# Patient Record
Sex: MALE | Race: WHITE | NOT HISPANIC OR LATINO | Employment: OTHER | URBAN - NONMETROPOLITAN AREA
[De-identification: names, ages, dates, MRNs, and addresses within clinical notes are randomized per-mention and may not be internally consistent; named-entity substitution may affect disease eponyms.]

---

## 2017-05-04 ENCOUNTER — TRANSCRIBE ORDERS (OUTPATIENT)
Dept: ADMINISTRATIVE | Facility: HOSPITAL | Age: 57
End: 2017-05-04

## 2017-05-04 DIAGNOSIS — R06.02 SOB (SHORTNESS OF BREATH): ICD-10-CM

## 2017-05-04 DIAGNOSIS — I25.10 ATHEROSCLEROSIS OF NATIVE CORONARY ARTERY OF NATIVE HEART WITHOUT ANGINA PECTORIS: ICD-10-CM

## 2017-05-04 DIAGNOSIS — R53.83 FATIGUE, UNSPECIFIED TYPE: Primary | ICD-10-CM

## 2018-04-03 ENCOUNTER — HOSPITAL ENCOUNTER (OUTPATIENT)
Dept: GENERAL RADIOLOGY | Facility: HOSPITAL | Age: 58
Discharge: HOME | End: 2018-04-03
Admitting: NURSE PRACTITIONER

## 2018-04-03 ENCOUNTER — TRANSCRIBE ORDERS (OUTPATIENT)
Dept: GENERAL RADIOLOGY | Facility: HOSPITAL | Age: 58
End: 2018-04-03

## 2018-04-03 DIAGNOSIS — M25.562 LEFT KNEE PAIN, UNSPECIFIED CHRONICITY: Primary | ICD-10-CM

## 2018-04-03 PROCEDURE — 73562 X-RAY EXAM OF KNEE 3: CPT

## 2018-05-17 ENCOUNTER — TRANSCRIBE ORDERS (OUTPATIENT)
Dept: ADMINISTRATIVE | Facility: HOSPITAL | Age: 58
End: 2018-05-17

## 2018-05-17 DIAGNOSIS — I20.0 UNSTABLE ANGINA PECTORIS (HCC): Primary | ICD-10-CM

## 2018-05-31 ENCOUNTER — HOSPITAL ENCOUNTER (OUTPATIENT)
Dept: CARDIOLOGY | Facility: HOSPITAL | Age: 58
Discharge: HOME | End: 2018-05-31
Attending: FAMILY MEDICINE

## 2018-05-31 ENCOUNTER — HOSPITAL ENCOUNTER (OUTPATIENT)
Dept: GENERAL RADIOLOGY | Facility: HOSPITAL | Age: 58
Discharge: HOME | End: 2018-05-31
Attending: FAMILY MEDICINE

## 2018-05-31 ENCOUNTER — TRANSCRIBE ORDERS (OUTPATIENT)
Dept: ADMINISTRATIVE | Facility: HOSPITAL | Age: 58
End: 2018-05-31

## 2018-05-31 ENCOUNTER — HOSPITAL ENCOUNTER (INPATIENT)
Facility: HOSPITAL | Age: 58
LOS: 1 days | Discharge: HOME | End: 2018-06-01
Attending: FAMILY MEDICINE | Admitting: FAMILY MEDICINE

## 2018-05-31 VITALS — HEART RATE: 72 BPM | SYSTOLIC BLOOD PRESSURE: 124 MMHG | DIASTOLIC BLOOD PRESSURE: 80 MMHG

## 2018-05-31 DIAGNOSIS — I20.0 UNSTABLE ANGINA (HCC): Primary | ICD-10-CM

## 2018-05-31 DIAGNOSIS — R06.02 SHORTNESS OF BREATH: ICD-10-CM

## 2018-05-31 DIAGNOSIS — I25.5 ISCHEMIC CARDIOMYOPATHY: Primary | ICD-10-CM

## 2018-05-31 DIAGNOSIS — I20.0 UNSTABLE ANGINA PECTORIS (HCC): ICD-10-CM

## 2018-05-31 LAB
APTT PPP: 28 SECONDS (ref 24.1–34.8)
ARTERIAL PATENCY WRIST A: POSITIVE
ATMOSPHERIC PRESS: 746 MMHG
BASE EXCESS BLDA CALC-SCNC: 2.2 MMOL/L (ref 0–2)
BASOPHILS # BLD AUTO: 0.12 10*3/MM3 (ref 0–0.2)
BASOPHILS NFR BLD AUTO: 1.1 % (ref 0–2)
BDY SITE: ABNORMAL
BH CV NUCLEAR PRIOR STUDY: 2
BH CV STRESS BP STAGE 1: NORMAL
BH CV STRESS COMMENTS STAGE 1: NORMAL
BH CV STRESS DOSE REGADENOSON STAGE 1: 0.4
BH CV STRESS DURATION MIN STAGE 1: 0
BH CV STRESS DURATION SEC STAGE 1: 10
BH CV STRESS HR STAGE 1: 86
BH CV STRESS PROTOCOL 1: NORMAL
BH CV STRESS RECOVERY BP: NORMAL MMHG
BH CV STRESS RECOVERY HR: 85 BPM
BH CV STRESS STAGE 1: 1
BILIRUB UR QL STRIP: NEGATIVE
BODY TEMPERATURE: 37 C
CLARITY UR: CLEAR
COLOR UR: YELLOW
D-LACTATE SERPL-SCNC: 1.1 MMOL/L (ref 0.5–2)
DEPRECATED RDW RBC AUTO: 40 FL (ref 40–54)
EOSINOPHIL # BLD AUTO: 0.3 10*3/MM3 (ref 0–0.7)
EOSINOPHIL NFR BLD AUTO: 2.7 % (ref 0–4)
ERYTHROCYTE [DISTWIDTH] IN BLOOD BY AUTOMATED COUNT: 12.2 % (ref 12–15)
GLUCOSE UR STRIP-MCNC: NEGATIVE MG/DL
HCO3 BLDA-SCNC: 26.9 MMOL/L (ref 20–26)
HCT VFR BLD AUTO: 45 % (ref 40–52)
HGB BLD-MCNC: 15.7 G/DL (ref 14–18)
HGB UR QL STRIP.AUTO: NEGATIVE
IMM GRANULOCYTES # BLD: 0.06 10*3/MM3 (ref 0–0.03)
IMM GRANULOCYTES NFR BLD: 0.5 % (ref 0–5)
INR PPP: 0.9 (ref 0.91–1.09)
KETONES UR QL STRIP: NEGATIVE
LEUKOCYTE ESTERASE UR QL STRIP.AUTO: NEGATIVE
LV EF NUC BP: 29 %
LYMPHOCYTES # BLD AUTO: 3.14 10*3/MM3 (ref 0.72–4.86)
LYMPHOCYTES NFR BLD AUTO: 28.6 % (ref 15–45)
Lab: ABNORMAL
MAGNESIUM SERPL-MCNC: 2 MG/DL (ref 1.4–2.2)
MAXIMAL PREDICTED HEART RATE: 161 BPM
MCH RBC QN AUTO: 31.3 PG (ref 28–32)
MCHC RBC AUTO-ENTMCNC: 34.9 G/DL (ref 33–36)
MCV RBC AUTO: 89.6 FL (ref 82–95)
MODALITY: ABNORMAL
MONOCYTES # BLD AUTO: 1 10*3/MM3 (ref 0.19–1.3)
MONOCYTES NFR BLD AUTO: 9.1 % (ref 4–12)
MYOGLOBIN SERPL-MCNC: 21 NG/ML (ref 0–110)
NEUTROPHILS # BLD AUTO: 6.35 10*3/MM3 (ref 1.87–8.4)
NEUTROPHILS NFR BLD AUTO: 58 % (ref 39–78)
NITRITE UR QL STRIP: NEGATIVE
NRBC BLD MANUAL-RTO: 0 /100 WBC (ref 0–0)
NT-PROBNP SERPL-MCNC: 92.4 PG/ML (ref 0–900)
PCO2 BLDA: 41 MM HG (ref 35–45)
PERCENT MAX PREDICTED HR: 53.42 %
PH BLDA: 7.42 PH UNITS (ref 7.35–7.45)
PH UR STRIP.AUTO: 5.5 [PH] (ref 5–8)
PLATELET # BLD AUTO: 221 10*3/MM3 (ref 130–400)
PMV BLD AUTO: 9.7 FL (ref 6–12)
PO2 BLDA: 72.8 MM HG (ref 83–108)
PROT UR QL STRIP: NEGATIVE
PROTHROMBIN TIME: 12.4 SECONDS (ref 11.9–14.6)
RBC # BLD AUTO: 5.02 10*6/MM3 (ref 4.8–5.9)
SAO2 % BLDCOA: 95.3 % (ref 94–99)
SP GR UR STRIP: 1.02 (ref 1–1.03)
STRESS BASELINE BP: NORMAL MMHG
STRESS BASELINE HR: 70 BPM
STRESS PERCENT HR: 63 %
STRESS POST EXERCISE DUR SEC: 10 SEC
STRESS POST PEAK BP: NORMAL MMHG
STRESS POST PEAK HR: 86 BPM
STRESS TARGET HR: 137 BPM
T4 FREE SERPL-MCNC: 1 NG/DL (ref 0.78–2.19)
TSH SERPL DL<=0.05 MIU/L-ACNC: 1.94 MIU/ML (ref 0.47–4.68)
UROBILINOGEN UR QL STRIP: NORMAL
VENTILATOR MODE: ABNORMAL
WBC NRBC COR # BLD: 10.97 10*3/MM3 (ref 4.8–10.8)

## 2018-05-31 PROCEDURE — 83874 ASSAY OF MYOGLOBIN: CPT | Performed by: NURSE PRACTITIONER

## 2018-05-31 PROCEDURE — 94799 UNLISTED PULMONARY SVC/PX: CPT

## 2018-05-31 PROCEDURE — 85610 PROTHROMBIN TIME: CPT | Performed by: NURSE PRACTITIONER

## 2018-05-31 PROCEDURE — 93005 ELECTROCARDIOGRAM TRACING: CPT | Performed by: NURSE PRACTITIONER

## 2018-05-31 PROCEDURE — 81003 URINALYSIS AUTO W/O SCOPE: CPT | Performed by: NURSE PRACTITIONER

## 2018-05-31 PROCEDURE — 93017 CV STRESS TEST TRACING ONLY: CPT

## 2018-05-31 PROCEDURE — 71046 X-RAY EXAM CHEST 2 VIEWS: CPT

## 2018-05-31 PROCEDURE — 85730 THROMBOPLASTIN TIME PARTIAL: CPT | Performed by: NURSE PRACTITIONER

## 2018-05-31 PROCEDURE — 84443 ASSAY THYROID STIM HORMONE: CPT | Performed by: NURSE PRACTITIONER

## 2018-05-31 PROCEDURE — 25010000002 ENOXAPARIN PER 10 MG: Performed by: NURSE PRACTITIONER

## 2018-05-31 PROCEDURE — 84439 ASSAY OF FREE THYROXINE: CPT | Performed by: NURSE PRACTITIONER

## 2018-05-31 PROCEDURE — 36600 WITHDRAWAL OF ARTERIAL BLOOD: CPT

## 2018-05-31 PROCEDURE — 78452 HT MUSCLE IMAGE SPECT MULT: CPT | Performed by: INTERNAL MEDICINE

## 2018-05-31 PROCEDURE — 78452 HT MUSCLE IMAGE SPECT MULT: CPT

## 2018-05-31 PROCEDURE — A9500 TC99M SESTAMIBI: HCPCS | Performed by: FAMILY MEDICINE

## 2018-05-31 PROCEDURE — 0 TECHNETIUM SESTAMIBI: Performed by: FAMILY MEDICINE

## 2018-05-31 PROCEDURE — 82803 BLOOD GASES ANY COMBINATION: CPT

## 2018-05-31 PROCEDURE — 83880 ASSAY OF NATRIURETIC PEPTIDE: CPT | Performed by: NURSE PRACTITIONER

## 2018-05-31 PROCEDURE — 93010 ELECTROCARDIOGRAM REPORT: CPT | Performed by: INTERNAL MEDICINE

## 2018-05-31 PROCEDURE — 25010000002 REGADENOSON 0.4 MG/5ML SOLUTION: Performed by: INTERNAL MEDICINE

## 2018-05-31 PROCEDURE — 93018 CV STRESS TEST I&R ONLY: CPT | Performed by: INTERNAL MEDICINE

## 2018-05-31 PROCEDURE — 99253 IP/OBS CNSLTJ NEW/EST LOW 45: CPT | Performed by: INTERNAL MEDICINE

## 2018-05-31 PROCEDURE — 83735 ASSAY OF MAGNESIUM: CPT | Performed by: NURSE PRACTITIONER

## 2018-05-31 PROCEDURE — 85025 COMPLETE CBC W/AUTO DIFF WBC: CPT | Performed by: NURSE PRACTITIONER

## 2018-05-31 PROCEDURE — 83605 ASSAY OF LACTIC ACID: CPT | Performed by: NURSE PRACTITIONER

## 2018-05-31 RX ORDER — CYCLOBENZAPRINE HCL 10 MG
10 TABLET ORAL 3 TIMES DAILY PRN
COMMUNITY

## 2018-05-31 RX ORDER — ASPIRIN 81 MG/1
81 TABLET ORAL DAILY
COMMUNITY

## 2018-05-31 RX ORDER — SODIUM CHLORIDE 0.9 % (FLUSH) 0.9 %
1-10 SYRINGE (ML) INJECTION AS NEEDED
Status: DISCONTINUED | OUTPATIENT
Start: 2018-05-31 | End: 2018-06-01 | Stop reason: HOSPADM

## 2018-05-31 RX ORDER — LISINOPRIL 20 MG/1
40 TABLET ORAL DAILY
Status: DISCONTINUED | OUTPATIENT
Start: 2018-05-31 | End: 2018-06-01

## 2018-05-31 RX ORDER — ROSUVASTATIN CALCIUM 10 MG/1
10 TABLET, COATED ORAL DAILY
Status: DISCONTINUED | OUTPATIENT
Start: 2018-05-31 | End: 2018-06-01

## 2018-05-31 RX ORDER — CLOPIDOGREL BISULFATE 75 MG/1
75 TABLET ORAL DAILY
Status: DISCONTINUED | OUTPATIENT
Start: 2018-05-31 | End: 2018-06-01

## 2018-05-31 RX ORDER — LISINOPRIL 40 MG/1
40 TABLET ORAL DAILY
COMMUNITY
End: 2018-09-17

## 2018-05-31 RX ORDER — CLONAZEPAM 0.5 MG/1
0.5 TABLET ORAL 2 TIMES DAILY PRN
Status: DISCONTINUED | OUTPATIENT
Start: 2018-05-31 | End: 2018-06-01 | Stop reason: HOSPADM

## 2018-05-31 RX ORDER — ASPIRIN 81 MG/1
81 TABLET ORAL DAILY
Status: DISCONTINUED | OUTPATIENT
Start: 2018-05-31 | End: 2018-06-01 | Stop reason: HOSPADM

## 2018-05-31 RX ORDER — CYCLOBENZAPRINE HCL 10 MG
10 TABLET ORAL 3 TIMES DAILY PRN
Status: DISCONTINUED | OUTPATIENT
Start: 2018-05-31 | End: 2018-06-01 | Stop reason: HOSPADM

## 2018-05-31 RX ORDER — SODIUM CHLORIDE 9 MG/ML
50 INJECTION, SOLUTION INTRAVENOUS CONTINUOUS
Status: DISCONTINUED | OUTPATIENT
Start: 2018-05-31 | End: 2018-06-01 | Stop reason: HOSPADM

## 2018-05-31 RX ORDER — LORAZEPAM 2 MG/ML
0.5 INJECTION INTRAMUSCULAR EVERY 6 HOURS PRN
Status: DISCONTINUED | OUTPATIENT
Start: 2018-05-31 | End: 2018-06-01 | Stop reason: HOSPADM

## 2018-05-31 RX ORDER — CLOPIDOGREL BISULFATE 75 MG/1
75 TABLET ORAL DAILY
COMMUNITY
End: 2022-04-14 | Stop reason: HOSPADM

## 2018-05-31 RX ORDER — CLONAZEPAM 1 MG/1
1 TABLET ORAL NIGHTLY PRN
COMMUNITY

## 2018-05-31 RX ORDER — SODIUM CHLORIDE 0.9 % (FLUSH) 0.9 %
1-10 SYRINGE (ML) INJECTION AS NEEDED
Status: DISCONTINUED | OUTPATIENT
Start: 2018-05-31 | End: 2018-05-31

## 2018-05-31 RX ADMIN — LISINOPRIL 40 MG: 20 TABLET ORAL at 20:08

## 2018-05-31 RX ADMIN — TECHNETIUM TC 99M SESTAMIBI 1 DOSE: 1 INJECTION INTRAVENOUS at 08:09

## 2018-05-31 RX ADMIN — ASPIRIN 81 MG: 81 TABLET ORAL at 20:06

## 2018-05-31 RX ADMIN — TECHNETIUM TC 99M SESTAMIBI 1 DOSE: 1 INJECTION INTRAVENOUS at 10:00

## 2018-05-31 RX ADMIN — REGADENOSON 0.4 MG: 0.08 INJECTION, SOLUTION INTRAVENOUS at 09:35

## 2018-05-31 RX ADMIN — CLOPIDOGREL 75 MG: 75 TABLET, FILM COATED ORAL at 20:07

## 2018-05-31 RX ADMIN — ROSUVASTATIN CALCIUM 10 MG: 10 TABLET, FILM COATED ORAL at 20:07

## 2018-05-31 RX ADMIN — CLONAZEPAM 0.5 MG: 0.5 TABLET ORAL at 20:06

## 2018-05-31 RX ADMIN — ENOXAPARIN SODIUM 40 MG: 40 INJECTION SUBCUTANEOUS at 20:08

## 2018-06-01 VITALS
TEMPERATURE: 97.7 F | HEART RATE: 63 BPM | DIASTOLIC BLOOD PRESSURE: 88 MMHG | OXYGEN SATURATION: 95 % | RESPIRATION RATE: 15 BRPM | WEIGHT: 197.9 LBS | HEIGHT: 72 IN | BODY MASS INDEX: 26.81 KG/M2 | SYSTOLIC BLOOD PRESSURE: 131 MMHG

## 2018-06-01 LAB
ALBUMIN SERPL-MCNC: 4.1 G/DL (ref 3.5–5)
ALBUMIN/GLOB SERPL: 1.5 G/DL (ref 1.1–2.5)
ALP SERPL-CCNC: 52 U/L (ref 24–120)
ALT SERPL W P-5'-P-CCNC: 41 U/L (ref 0–54)
ANION GAP SERPL CALCULATED.3IONS-SCNC: 11 MMOL/L (ref 4–13)
ARTERIAL PATENCY WRIST A: NORMAL
AST SERPL-CCNC: 30 U/L (ref 7–45)
ATMOSPHERIC PRESS: 748 MMHG
ATMOSPHERIC PRESS: 748 MMHG
BASOPHILS # BLD MANUAL: 0.16 10*3/MM3 (ref 0–0.2)
BASOPHILS NFR BLD AUTO: 2 % (ref 0–2)
BDY SITE: NORMAL
BDY SITE: NORMAL
BILIRUB SERPL-MCNC: 0.8 MG/DL (ref 0.1–1)
BODY TEMPERATURE: 37 C
BODY TEMPERATURE: 37 C
BUN BLD-MCNC: 16 MG/DL (ref 5–21)
BUN/CREAT SERPL: 21.9 (ref 7–25)
CALCIUM SPEC-SCNC: 9.4 MG/DL (ref 8.4–10.4)
CHLORIDE SERPL-SCNC: 102 MMOL/L (ref 98–110)
CO2 SERPL-SCNC: 26 MMOL/L (ref 24–31)
COHGB MFR BLD: 0.8 % (ref 0–5)
COHGB MFR BLD: 1.1 % (ref 0–5)
CREAT BLD-MCNC: 0.73 MG/DL (ref 0.5–1.4)
DEPRECATED RDW RBC AUTO: 40.9 FL (ref 40–54)
ERYTHROCYTE [DISTWIDTH] IN BLOOD BY AUTOMATED COUNT: 12.4 % (ref 12–15)
GAS FLOW AIRWAY: 2 LPM
GAS FLOW AIRWAY: 2 LPM
GFR SERPL CREATININE-BSD FRML MDRD: 110 ML/MIN/1.73
GLOBULIN UR ELPH-MCNC: 2.7 GM/DL
GLUCOSE BLD-MCNC: 108 MG/DL (ref 70–100)
HCT VFR BLD AUTO: 44.8 % (ref 40–52)
HGB BLD-MCNC: 15.7 G/DL (ref 14–18)
HGB BLDA-MCNC: 16 G/DL (ref 14–18)
HGB BLDA-MCNC: 16.2 G/DL (ref 14–18)
LYMPHOCYTES # BLD MANUAL: 2.1 10*3/MM3 (ref 0.72–4.86)
LYMPHOCYTES NFR BLD MANUAL: 2 % (ref 4–12)
LYMPHOCYTES NFR BLD MANUAL: 26.3 % (ref 15–45)
Lab: NORMAL
Lab: NORMAL
MCH RBC QN AUTO: 31.5 PG (ref 28–32)
MCHC RBC AUTO-ENTMCNC: 35 G/DL (ref 33–36)
MCV RBC AUTO: 89.8 FL (ref 82–95)
METHGB BLD QL: 0.7 % (ref 0–3)
METHGB BLD QL: 0.8 % (ref 0–3)
MODALITY: NORMAL
MODALITY: NORMAL
MONOCYTES # BLD AUTO: 0.16 10*3/MM3 (ref 0.19–1.3)
NEUTROPHILS # BLD AUTO: 5.08 10*3/MM3 (ref 1.87–8.4)
NEUTROPHILS NFR BLD MANUAL: 63.6 % (ref 39–78)
NT-PROBNP SERPL-MCNC: 96.1 PG/ML (ref 0–900)
OXYHGB MFR BLDV: 74.9 % (ref 45–75)
OXYHGB MFR BLDV: 96.8 % (ref 94–99)
PLAT MORPH BLD: NORMAL
PLATELET # BLD AUTO: 192 10*3/MM3 (ref 130–400)
PMV BLD AUTO: 9.3 FL (ref 6–12)
POTASSIUM BLD-SCNC: 4.3 MMOL/L (ref 3.5–5.3)
PROT SERPL-MCNC: 6.8 G/DL (ref 6.3–8.7)
RBC # BLD AUTO: 4.99 10*6/MM3 (ref 4.8–5.9)
RBC MORPH BLD: NORMAL
SODIUM BLD-SCNC: 139 MMOL/L (ref 135–145)
VARIANT LYMPHS NFR BLD MANUAL: 6.1 % (ref 0–5)
VENTILATOR MODE: NORMAL
VENTILATOR MODE: NORMAL
WBC MORPH BLD: NORMAL
WBC NRBC COR # BLD: 7.99 10*3/MM3 (ref 4.8–10.8)

## 2018-06-01 PROCEDURE — B2111ZZ FLUOROSCOPY OF MULTIPLE CORONARY ARTERIES USING LOW OSMOLAR CONTRAST: ICD-10-PCS | Performed by: INTERNAL MEDICINE

## 2018-06-01 PROCEDURE — 82375 ASSAY CARBOXYHB QUANT: CPT

## 2018-06-01 PROCEDURE — 85007 BL SMEAR W/DIFF WBC COUNT: CPT | Performed by: NURSE PRACTITIONER

## 2018-06-01 PROCEDURE — 0 IOPAMIDOL PER 1 ML: Performed by: INTERNAL MEDICINE

## 2018-06-01 PROCEDURE — 83050 HGB METHEMOGLOBIN QUAN: CPT

## 2018-06-01 PROCEDURE — 83880 ASSAY OF NATRIURETIC PEPTIDE: CPT | Performed by: NURSE PRACTITIONER

## 2018-06-01 PROCEDURE — B2151ZZ FLUOROSCOPY OF LEFT HEART USING LOW OSMOLAR CONTRAST: ICD-10-PCS | Performed by: INTERNAL MEDICINE

## 2018-06-01 PROCEDURE — 80053 COMPREHEN METABOLIC PANEL: CPT | Performed by: NURSE PRACTITIONER

## 2018-06-01 PROCEDURE — 25010000002 DIPHENHYDRAMINE PER 50 MG: Performed by: INTERNAL MEDICINE

## 2018-06-01 PROCEDURE — C1894 INTRO/SHEATH, NON-LASER: HCPCS | Performed by: INTERNAL MEDICINE

## 2018-06-01 PROCEDURE — 93457 R HRT ART/GRFT ANGIO: CPT | Performed by: INTERNAL MEDICINE

## 2018-06-01 PROCEDURE — 25010000002 MIDAZOLAM PER 1 MG: Performed by: INTERNAL MEDICINE

## 2018-06-01 PROCEDURE — C1760 CLOSURE DEV, VASC: HCPCS | Performed by: INTERNAL MEDICINE

## 2018-06-01 PROCEDURE — 94799 UNLISTED PULMONARY SVC/PX: CPT

## 2018-06-01 PROCEDURE — 85027 COMPLETE CBC AUTOMATED: CPT | Performed by: NURSE PRACTITIONER

## 2018-06-01 PROCEDURE — 99152 MOD SED SAME PHYS/QHP 5/>YRS: CPT | Performed by: INTERNAL MEDICINE

## 2018-06-01 PROCEDURE — 25010000002 FENTANYL CITRATE (PF) 100 MCG/2ML SOLUTION: Performed by: INTERNAL MEDICINE

## 2018-06-01 PROCEDURE — 82820 HEMOGLOBIN-OXYGEN AFFINITY: CPT

## 2018-06-01 PROCEDURE — 94760 N-INVAS EAR/PLS OXIMETRY 1: CPT

## 2018-06-01 PROCEDURE — 4A023N7 MEASUREMENT OF CARDIAC SAMPLING AND PRESSURE, LEFT HEART, PERCUTANEOUS APPROACH: ICD-10-PCS | Performed by: INTERNAL MEDICINE

## 2018-06-01 RX ORDER — FENTANYL CITRATE 50 UG/ML
INJECTION, SOLUTION INTRAMUSCULAR; INTRAVENOUS AS NEEDED
Status: DISCONTINUED | OUTPATIENT
Start: 2018-06-01 | End: 2018-06-01 | Stop reason: HOSPADM

## 2018-06-01 RX ORDER — DIPHENHYDRAMINE HYDROCHLORIDE 50 MG/ML
INJECTION INTRAMUSCULAR; INTRAVENOUS AS NEEDED
Status: DISCONTINUED | OUTPATIENT
Start: 2018-06-01 | End: 2018-06-01 | Stop reason: HOSPADM

## 2018-06-01 RX ORDER — ACETAMINOPHEN 325 MG/1
650 TABLET ORAL EVERY 4 HOURS PRN
Status: DISCONTINUED | OUTPATIENT
Start: 2018-06-01 | End: 2018-06-01 | Stop reason: HOSPADM

## 2018-06-01 RX ORDER — CLOPIDOGREL BISULFATE 75 MG/1
75 TABLET ORAL NIGHTLY
Status: DISCONTINUED | OUTPATIENT
Start: 2018-06-01 | End: 2018-06-01 | Stop reason: HOSPADM

## 2018-06-01 RX ORDER — LISINOPRIL 20 MG/1
40 TABLET ORAL NIGHTLY
Status: DISCONTINUED | OUTPATIENT
Start: 2018-06-01 | End: 2018-06-01 | Stop reason: HOSPADM

## 2018-06-01 RX ORDER — SODIUM CHLORIDE 9 MG/ML
100 INJECTION, SOLUTION INTRAVENOUS CONTINUOUS
Status: DISCONTINUED | OUTPATIENT
Start: 2018-06-01 | End: 2018-06-01 | Stop reason: HOSPADM

## 2018-06-01 RX ORDER — MIDAZOLAM HYDROCHLORIDE 1 MG/ML
INJECTION INTRAMUSCULAR; INTRAVENOUS AS NEEDED
Status: DISCONTINUED | OUTPATIENT
Start: 2018-06-01 | End: 2018-06-01 | Stop reason: HOSPADM

## 2018-06-01 RX ORDER — LIDOCAINE HYDROCHLORIDE 20 MG/ML
INJECTION, SOLUTION INFILTRATION; PERINEURAL AS NEEDED
Status: DISCONTINUED | OUTPATIENT
Start: 2018-06-01 | End: 2018-06-01 | Stop reason: HOSPADM

## 2018-06-01 RX ORDER — ROSUVASTATIN CALCIUM 10 MG/1
10 TABLET, COATED ORAL NIGHTLY
Status: DISCONTINUED | OUTPATIENT
Start: 2018-06-01 | End: 2018-06-01 | Stop reason: HOSPADM

## 2018-06-01 RX ADMIN — ASPIRIN 81 MG: 81 TABLET ORAL at 08:11

## 2018-06-01 RX ADMIN — METOPROLOL TARTRATE 25 MG: 25 TABLET, FILM COATED ORAL at 13:03

## 2018-06-01 RX ADMIN — SODIUM CHLORIDE 50 ML/HR: 9 INJECTION, SOLUTION INTRAVENOUS at 06:50

## 2018-06-01 NOTE — PLAN OF CARE
Problem: Patient Care Overview  Goal: Plan of Care Review  Outcome: Ongoing (interventions implemented as appropriate)   06/01/18 0430   Coping/Psychosocial   Plan of Care Reviewed With patient   Plan of Care Review   Progress no change   OTHER   Outcome Summary No c/o pain this shift. VSS. S 61-67 on tele. HC planned for today. UA sent to lab. NPO. Will continue to monitor.      Goal: Individualization and Mutuality  Outcome: Ongoing (interventions implemented as appropriate)    Goal: Discharge Needs Assessment  Outcome: Ongoing (interventions implemented as appropriate)      Problem: Cardiac: Heart Failure (Pediatric)  Goal: Signs and Symptoms of Listed Potential Problems Will be Absent, Minimized or Managed (Cardiac: Heart Failure)  Outcome: Ongoing (interventions implemented as appropriate)

## 2018-06-01 NOTE — PAYOR COMM NOTE
"6/1/18 [facility] [phone]. [phone]  ER INPATIENT ADMISSION ON 5/31/18  CLINICAL FOR REVIEW.            Ramesh Daniels (58 y.o. Male)     Date of Birth Social Security Number Address Home Phone MRN    1960  [id number] [facility] [address] [phone] [id number]    Religious Marital Status          Yarsanism        Admission Date Admission Type Admitting Provider Attending Provider Department, Room/Bed    5/31/18 Emergency Santiago, MD Santiago Read James Kyle, MD [facility] CATH LAB, Pool/NONE    Discharge Date Discharge Disposition Discharge Destination                       Attending Provider:  Derrell Henderson MD    Allergies:  No Known Allergies    Isolation:  None   Infection:  None   Code Status:  FULL    Ht:  182.9 cm (72\")   Wt:  89.8 kg (197 lb 14.4 oz)    Admission Cmt:  None   Principal Problem:  Ischemic cardiomyopathy [I25.5]                 Active Insurance as of 5/31/2018     Primary Coverage     Payor Plan Insurance Group Employer/Plan Group    [employer] [facility] EMPL[employer]EE [id number]     Payor Plan Address Payor Plan Phone Number Effective From Effective To    [id number] [phone] 1/1/2015     [facility] [id number]       Subscriber Name Subscriber Birth Date Member ID       RAMESH DANIELS JR. 1960 [id number]                 Emergency Contacts      (Rel.) Home Phone Work Phone Mobile Phone    Jose Daniels (Son) [phone] -- --        H&P  Date of Service: 5/31/2018 4:34 PM  Derrell Mccann MD   Medicine   Expand All Collapse All    []Manual[]Template  []Copied           Patient Care Team:  Derrell Henderson MD as PCP - General (Family Medicine)     Chief complaint SOB, Abnormal Lexiscan        Subjective         Shortness of Breath   This is a new problem. The current episode started 1 to 4 weeks ago. The problem occurs every few minutes. The problem has been gradually " worsening. Associated symptoms include chest pain and orthopnea. Pertinent negatives include no abdominal pain, headaches, hemoptysis, leg pain, leg swelling, neck pain, rash, sore throat, sputum production, swollen glands, syncope, vomiting or wheezing. The symptoms are aggravated by any activity. The patient has no known risk factors for DVT/PE. The treatment provided no relief. His past medical history is significant for CAD. There is no history of pneumonia or a recent surgery.         Review of Systems   Constitutional: Positive for activity change, diaphoresis and fatigue. Negative for appetite change and chills.   HENT: Negative for congestion and sore throat.    Respiratory: Positive for chest tightness and shortness of breath. Negative for apnea, cough, hemoptysis, sputum production, choking and wheezing.    Cardiovascular: Positive for chest pain, palpitations and orthopnea. Negative for leg swelling and syncope.   Gastrointestinal: Negative for abdominal pain and vomiting.   Musculoskeletal: Negative for arthralgias, back pain and neck pain.   Skin: Negative for color change and rash.   Neurological: Negative for dizziness, syncope, weakness, light-headedness and headaches.         Medical History        Past Medical History:   Diagnosis Date   • Hyperlipidemia     • Hypertension           Surgical History         Past Surgical History:   Procedure Laterality Date   • CARDIAC CATHETERIZATION       • CORONARY ARTERY BYPASS GRAFT             No family history on file.       Social History   Substance Use Topics   • Smoking status: Not on file   • Smokeless tobacco: Not on file   • Alcohol use Not on file      Prescriptions Prior to Admission   No prescriptions prior to admission.         Allergies:  Patient has no known allergies.        Objective          Vital Signs  Heart Rate:  [72] 72  BP: (124)/(80) 124/80     Physical Exam   Constitutional: He is oriented to person, place, and time. He appears  well-developed and well-nourished. He appears distressed.   HENT:   Head: Normocephalic and atraumatic.   Eyes: Conjunctivae and EOM are normal. Pupils are equal, round, and reactive to light.   Neck: Normal range of motion. Neck supple.   Cardiovascular: Normal rate and regular rhythm.    Pulmonary/Chest: Effort normal and breath sounds normal. No respiratory distress. He has no wheezes. He has no rales.   Abdominal: Soft. Bowel sounds are normal. He exhibits no distension. There is no guarding.   Musculoskeletal: Normal range of motion.   Neurological: He is alert and oriented to person, place, and time.   Skin: Skin is warm and dry. He is not diaphoretic.   Psychiatric: He has a normal mood and affect.         Results Review:              I reviewed the patient's new clinical results.           Assessment/Plan         Principal Problem:    Ischemic cardiomyopathy  Active Problems:    SOB (shortness of breath)        Assessment:  (Ischemia Cardiomyopathy-EF 29% per THOMAS  SOB  HX CABG).      Plan:   NPO.  (Reviewed Lexiscan, pt symptomatic  Admit   Telemetry  Cardiology Consult  Cardiac Enzymes   Will follow clinical course closely  ).         I discussed the patients findings and my recommendations with patient     Angelina Vogel, APRN  05/31/18  4:34 PM     Time: will admit      Plan admit to telemetry  Will need urgent heart cath  Risk vs benefit d/w pt  Appreciate cards input     I have discussed the care of Ramesh Daniels , including pertinent history and exam findings with the ARNP/PA.  I have seen and examined the patient and the key elements of all parts of the encounter have been performed by me. I agree with the assessment and plan as outlined by the ARNP/PA. Please refer to my separate note for complete documentation.      Electronically signed by Derrell Mccann MD on 5/31/2018 at 6:24 PM            Progress Notes  Date of Service: 6/1/2018 7:43 AM  Derrell Mccann MD   Medicine   Expand  "All Collapse All    []Manual[]Template  []Copied  Ramesh Daniels Jr. is a 58 y.o. male patient.  No further chest pain overnight.  Upon further questioning he has had a rapid decline in his tolerance to activity.  F planned at 9 AM today.        Current Medications             Current Facility-Administered Medications   Medication Dose Route Frequency Provider Last Rate Last Dose   • aspirin EC tablet 81 mg  81 mg Oral Daily Derrell Mccann MD   81 mg at 05/31/18 2006   • clonazePAM (KlonoPIN) tablet 0.5 mg  0.5 mg Oral BID PRN Derrell Mccann MD   0.5 mg at 05/31/18 2006   • clopidogrel (PLAVIX) tablet 75 mg  75 mg Oral Daily Derrell Mccann MD   75 mg at 05/31/18 2007   • cyclobenzaprine (FLEXERIL) tablet 10 mg  10 mg Oral TID PRN Derrell Mccann MD       • enoxaparin (LOVENOX) syringe 40 mg  40 mg Subcutaneous Q24H DONYA Hdez   40 mg at 05/31/18 2008   • lisinopril (PRINIVIL,ZESTRIL) tablet 40 mg  40 mg Oral Daily Derrell Mccann MD   40 mg at 05/31/18 2008   • LORazepam (ATIVAN) injection 0.5 mg  0.5 mg Intravenous Q6H PRN DONYA Hdez       • rosuvastatin (CRESTOR) tablet 10 mg  10 mg Oral Daily Derrell Mccann MD   10 mg at 05/31/18 2007   • sodium chloride 0.9 % flush 1-10 mL  1-10 mL Intravenous PRN Derrell Mccann MD       • sodium chloride 0.9 % infusion  50 mL/hr Intravenous Continuous DONYA Hdez 50 mL/hr at 06/01/18 0650 50 mL/hr at 06/01/18 0650         ALLERGIES:  No Known Allergies  Principal Problem:    Ischemic cardiomyopathy  Active Problems:    SOB (shortness of breath)     Blood pressure 121/68, pulse 68, temperature 97.7 °F (36.5 °C), temperature source Temporal Artery , resp. rate 20, height 182.9 cm (72\"), weight 89.8 kg (197 lb 14.4 oz), SpO2 96 %.        Subjective:  Symptoms:  Stable.  He reports anxiety.  No shortness of breath or chest pain.    Diet:  Adequate intake.    Activity level: Normal.    Pain:  He reports " "no pain.       Review of Systems   Respiratory: Negative for shortness of breath.    Cardiovascular: Negative for chest pain.      Objective:  General Appearance:  Comfortable and well-appearing.    Vital signs: (most recent): Blood pressure 121/68, pulse 68, temperature 97.7 °F (36.5 °C), temperature source Temporal Artery , resp. rate 20, height 182.9 cm (72\"), weight 89.8 kg (197 lb 14.4 oz), SpO2 96 %.  Vital signs are normal.    Output: Producing urine and producing stool.    HEENT: Normal HEENT exam.    Lungs:  Normal effort and normal respiratory rate.    Heart: Normal rate.  Regular rhythm.    Abdomen: Abdomen is soft.  Bowel sounds are normal.     Neurological: Patient is alert and oriented to person, place and time.    Skin:  Warm and dry.                   Labs:          Lab Results (last 72 hours)      Procedure Component Value Units Date/Time     BNP [[id number]] Collected:  06/01/18 0717     Specimen:  Blood Updated:  06/01/18 0723     Comprehensive Metabolic Panel [[id number]] Collected:  06/01/18 0717     Specimen:  Blood Updated:  06/01/18 0723     Manual Differential [[id number]] Collected:  06/01/18 0717     Specimen:  Blood Updated:  06/01/18 0723     CBC & Differential [[id number]] Collected:  06/01/18 0717     Specimen:  Blood Updated:  06/01/18 0723     Narrative:        The following orders were created for panel order CBC & Differential.  Procedure                               Abnormality         Status                     ---------                               -----------         ------                     Manual Differential[[id number]]                              In process                 CBC Auto Differential[[id number]]                            In process                    Please view results for these tests on the individual orders.     CBC Auto Differential [[id number]] Collected:  06/01/18 0717     Specimen:  Blood Updated:  06/01/18 0723     Urinalysis With / Microscopic If " Indicated (No Culture) - Urine, Clean Catch [[id number]]  (Normal) Collected:  05/31/18 2000     Specimen:  Urine from Urine, Clean Catch Updated:  05/31/18 2052       Color, UA Yellow       Appearance, UA Clear       pH, UA 5.5       Specific Gravity, UA 1.020       Glucose, UA Negative       Ketones, UA Negative       Bilirubin, UA Negative       Blood, UA Negative       Protein, UA Negative       Leuk Esterase, UA Negative       Nitrite, UA Negative       Urobilinogen, UA 0.2 E.U./dL     Narrative:        Urine microscopic not indicated.     Blood Gas, Arterial [[id number]]  (Abnormal) Collected:  05/31/18 1835     Specimen:  Arterial Blood Updated:  05/31/18 1840       Site Right Radial       Ventura's Test Positive       pH, Arterial 7.425 pH units         pCO2, Arterial 41.0 mm Hg         pO2, Arterial 72.8 (L) mm Hg         HCO3, Arterial 26.9 (H) mmol/L         Base Excess, Arterial 2.2 (H) mmol/L         O2 Saturation, Arterial 95.3 %         Temperature 37.0 C         Barometric Pressure for Blood Gas 746 mmHg         Modality Room Air       Ventilator Mode NA       Collected by [id number]     TSH [[id number]]  (Normal) Collected:  05/31/18 1728     Specimen:  Blood Updated:  05/31/18 1825       TSH 1.940 mIU/mL       T4, Free [[id number]]  (Normal) Collected:  05/31/18 1728     Specimen:  Blood Updated:  05/31/18 1811       Free T4 1.00 ng/dL       BNP [[id number]]  (Normal) Collected:  05/31/18 1728     Specimen:  Blood Updated:  05/31/18 1803       proBNP 92.4 pg/mL       Myoglobin, Serum [[id number]]  (Normal) Collected:  05/31/18 1728     Specimen:  Blood Updated:  05/31/18 1803       Myoglobin 21.0 ng/mL       Lactic Acid, Plasma [[id number]]  (Normal) Collected:  05/31/18 1728     Specimen:  Blood Updated:  05/31/18 1753       Lactate 1.1 mmol/L       Magnesium [[id number]]  (Normal) Collected:  05/31/18 1728     Specimen:  Blood Updated:  05/31/18 1753       Magnesium 2.0 mg/dL       aPTT [[id number]]  (Normal)  Collected:  05/31/18 1728     Specimen:  Blood Updated:  05/31/18 1749       PTT 28.0 seconds       Protime-INR [[id number]]  (Abnormal) Collected:  05/31/18 1728     Specimen:  Blood Updated:  05/31/18 1749       Protime 12.4 Seconds         INR 0.90 (L)     CBC Auto Differential [[id number]]  (Abnormal) Collected:  05/31/18 1728     Specimen:  Blood Updated:  05/31/18 1739       WBC 10.97 (H) 10*3/mm3         RBC 5.02 10*6/mm3         Hemoglobin 15.7 g/dL         Hematocrit 45.0 %         MCV 89.6 fL         MCH 31.3 pg         MCHC 34.9 g/dL         RDW 12.2 %         RDW-SD 40.0 fl         MPV 9.7 fL         Platelets 221 10*3/mm3         Neutrophil % 58.0 %         Lymphocyte % 28.6 %         Monocyte % 9.1 %         Eosinophil % 2.7 %         Basophil % 1.1 %         Immature Grans % 0.5 %         Neutrophils, Absolute 6.35 10*3/mm3         Lymphocytes, Absolute 3.14 10*3/mm3         Monocytes, Absolute 1.00 10*3/mm3         Eosinophils, Absolute 0.30 10*3/mm3         Basophils, Absolute 0.12 10*3/mm3         Immature Grans, Absolute 0.06 (H) 10*3/mm3         nRBC 0.0 /100 WBC                   Imaging Results (last 72 hours)      ** No results found for the last 72 hours. **                      Assessment:    Condition: In stable condition.  Improving.   (Patient Active Problem List:     Ischemic cardiomyopathy     SOB (shortness of breath)     ).      Plan:   Transfer Plan: to cath lab.  Encourage ambulation.  Consults: cardiology.  Regular diet.  (Plan cath today  [facility]  Follow post cath  ).          Patient Active Problem List   Diagnosis   • Ischemic cardiomyopathy   • SOB (shortness of breath)      Derrell Mccann MD  6/1/2018                                                                                         Hospital Medications (active)       Dose Frequency Start End    aspirin EC tablet 81 mg (MAR Hold) ((MAR Hold) since 6/1/2018  9:26 AM) 81 mg Daily 5/31/2018     Sig - Route: Take 1  tablet by mouth Daily. - Oral    clonazePAM (KlonoPIN) tablet 0.5 mg 0.5 mg 2 Times Daily PRN 5/31/2018     Sig - Route: Take 1 tablet by mouth 2 (Two) Times a Day As Needed for Seizures. - Oral    clopidogrel (PLAVIX) tablet 75 mg (MAR Hold) ((MAR Hold) since 6/1/2018  9:26 AM) 75 mg Daily 5/31/2018     Sig - Route: Take 1 tablet by mouth Daily. - Oral    cyclobenzaprine (FLEXERIL) tablet 10 mg (MAR Hold) ((MAR Hold) since 6/1/2018  9:26 AM) 10 mg 3 Times Daily PRN 5/31/2018     Sig - Route: Take 1 tablet by mouth 3 (Three) Times a Day As Needed for Muscle Spasms. - Oral    enoxaparin (LOVENOX) syringe 40 mg (MAR Hold) ((MAR Hold) since 6/1/2018  9:26 AM) 40 mg Every 24 Hours 5/31/2018     Sig - Route: Inject 0.4 mL under the skin Daily. - Subcutaneous    lisinopril (PRINIVIL,ZESTRIL) tablet 40 mg 40 mg Daily 5/31/2018     Sig - Route: Take 2 tablets by mouth Daily. - Oral    LORazepam (ATIVAN) injection 0.5 mg (MAR Hold) ((MAR Hold) since 6/1/2018  9:26 AM) 0.5 mg Every 6 Hours PRN 5/31/2018 6/10/2018    Sig - Route: Infuse 0.25 mL into a venous catheter Every 6 (Six) Hours As Needed for Anxiety. - Intravenous    rosuvastatin (CRESTOR) tablet 10 mg (MAR Hold) ((MAR Hold) since 6/1/2018  9:26 AM) 10 mg Daily 5/31/2018     Sig - Route: Take 1 tablet by mouth Daily. - Oral    sodium chloride 0.9 % flush 1-10 mL (MAR Hold) ((MAR Hold) since 6/1/2018  9:26 AM) 1-10 mL As Needed 5/31/2018     Sig - Route: Infuse 1-10 mL into a venous catheter As Needed for Line Care. - Intravenous    sodium chloride 0.9 % infusion 50 mL/hr Continuous 5/31/2018     Sig - Route: Infuse 50 mL/hr into a venous catheter Continuous. - Intravenous    diphenhydrAMINE (BENADRYL) injection (Discontinued)  As Needed 6/1/2018 6/1/2018    Sig: As Needed.    Reason for Discontinue: Patient Discharge    enoxaparin (LOVENOX) syringe 40 mg (Discontinued) 40 mg Every 24 Hours 5/31/2018 5/31/2018    Sig - Route: Inject 0.4 mL under the skin Daily. -  Subcutaneous    Reason for Discontinue: Duplicate order    FentaNYL Citrate (PF) (SUBLIMAZE) injection (Discontinued)  As Needed 6/1/2018 6/1/2018    Sig: As Needed.    Reason for Discontinue: Patient Discharge    iopamidol (ISOVUE-370) 76 % injection (Discontinued)  As Needed 6/1/2018 6/1/2018    Sig: As Needed.    Reason for Discontinue: Patient Discharge    lidocaine (XYLOCAINE) 2% injection (Discontinued)  As Needed 6/1/2018 6/1/2018    Sig: As Needed.    Reason for Discontinue: Patient Discharge    midazolam (VERSED) injection (Discontinued)  As Needed 6/1/2018 6/1/2018    Sig: As Needed.    Reason for Discontinue: Patient Discharge    sodium chloride 0.9 % flush 1-10 mL (Discontinued) 1-10 mL As Needed 5/31/2018 5/31/2018    Sig - Route: Infuse 1-10 mL into a venous catheter As Needed for Line Care. - Intravenous    sodium chloride 0.9 % flush 1-10 mL (Discontinued) 1-10 mL As Needed 5/31/2018 5/31/2018    Sig - Route: Infuse 1-10 mL into a venous catheter As Needed for Line Care. - Intravenous

## 2018-06-01 NOTE — PROGRESS NOTES
"Please see cardiac cath report from today under the \"cardiology\" tab.    Patient with chronic systolic CHF, due to ischemic cardiomyopathy - LVEF 29% by nuclear.  At baseline, has NYHA II symptoms.  Last known myocardial infarction was at time of initial evaluation by Dr. Wiley in 2008 and at that time, LVEF 40%.  Therefore, since patient just diagnosed with LVEF of 29%, will consider ICD in the future; however, in the meantime, we will order LifeVest for primary prevention.  We will plan to re-evaluate the LVEF in 90 days after being on OMT and if still < 35%, consider ICD placement at that time.  "

## 2018-06-01 NOTE — PROGRESS NOTES
Discharge Planning Assessment  Psychiatric     Patient Name: Ramesh Daniels Jr.  MRN: [id number]  Today's Date: 6/1/2018    Admit Date: 5/31/2018          Discharge Needs Assessment     Row Name 06/01/18 1159       Living Environment    Current Living Arrangements home/apartment/condo    Primary Care Provided by self    Provides Primary Care For no one    Family Caregiver if Needed child(anastacio), adult    Quality of Family Relationships involved;supportive    Able to Return to Prior Arrangements yes       Resource/Environmental Concerns    Resource/Environmental Concerns none    Transportation Concerns car, none       Transition Planning    Patient/Family Anticipates Transition to home    Patient/Family Anticipated Services at Transition none    Transportation Anticipated family or friend will provide       Discharge Needs Assessment    Readmission Within the Last 30 Days no previous admission in last 30 days    Concerns to be Addressed no discharge needs identified;denies needs/concerns at this time    Equipment Currently Used at Home none    Anticipated Changes Related to Illness none    Equipment Needed After Discharge other (see comments)   LifeVest            Discharge Plan     Row Name 06/01/18 1200       Plan    Plan PT resides at home and is independent. PT plans to dc to the same. PT requires a LifeVest upon dc. SW has faxed required infomration to [employer] for insurance approval and fitting. Will await response from [employer].     Patient/Family in Agreement with Plan yes        Destination     No service coordination in this encounter.      Durable Medical Equipment     No service coordination in this encounter.      Dialysis/Infusion     No service coordination in this encounter.      Home Medical Care     No service coordination in this encounter.      Social Care     No service coordination in this encounter.                Demographic Summary    No documentation.           Functional Status    No documentation.            Psychosocial    No documentation.           Abuse/Neglect    No documentation.           Legal    No documentation.           Substance Abuse    No documentation.           Patient Forms    No documentation.         Earlene Honeycutt MSW

## 2018-06-01 NOTE — DISCHARGE PLACEMENT REQUEST
"Ramesh Daniels (58 y.o. Male)     Date of Birth Social Security Number Address Home Phone MRN    1960  [address] [facility] [phone] [id number]    Sikhism Marital Status          Gnosticist        Admission Date Admission Type Admitting Provider Attending Provider Department, Room/Bed    5/31/18 Emergency Derrell Henderson MD [place], James Kyle, MD [facility] CATH LAB, Pool/NONE    Discharge Date Discharge Disposition Discharge Destination                       Attending Provider:  Derrell Henderson MD    Allergies:  No Known Allergies    Isolation:  None   Infection:  None   Code Status:  FULL    Ht:  182.9 cm (72\")   Wt:  89.8 kg (197 lb 14.4 oz)    Admission Cmt:  None   Principal Problem:  Ischemic cardiomyopathy [I25.5]                 Active Insurance as of 5/31/2018     Primary Coverage     Payor Plan Insurance Group Employer/Plan Group    [employer] [id number]     Payor Plan Address Payor Plan Phone Number Effective From Effective To    [address] [phone] 1/1/2015     [facility] [id number]       Subscriber Name Subscriber Birth Date Member ID       RAMESH DANIELS JRNelson 1960 [id number]                 Emergency Contacts      (Rel.) Home Phone Work Phone Mobile Phone    Jose Daniels (Son) [phone] -- --               History & Physical      Derrell Mccann MD at 5/31/2018  4:34 PM                Patient Care Team:  Derrell Henderson MD as PCP - General (Family Medicine)    Chief complaint SOB, Abnormal Lexiscan    Subjective     Shortness of Breath   This is a new problem. The current episode started 1 to 4 weeks ago. The problem occurs every few minutes. The problem has been gradually worsening. Associated symptoms include chest pain and orthopnea. Pertinent negatives include no abdominal pain, headaches, hemoptysis, leg pain, leg swelling, neck pain, rash, sore throat, sputum " production, swollen glands, syncope, vomiting or wheezing. The symptoms are aggravated by any activity. The patient has no known risk factors for DVT/PE. The treatment provided no relief. His past medical history is significant for CAD. There is no history of pneumonia or a recent surgery.       Review of Systems   Constitutional: Positive for activity change, diaphoresis and fatigue. Negative for appetite change and chills.   HENT: Negative for congestion and sore throat.    Respiratory: Positive for chest tightness and shortness of breath. Negative for apnea, cough, hemoptysis, sputum production, choking and wheezing.    Cardiovascular: Positive for chest pain, palpitations and orthopnea. Negative for leg swelling and syncope.   Gastrointestinal: Negative for abdominal pain and vomiting.   Musculoskeletal: Negative for arthralgias, back pain and neck pain.   Skin: Negative for color change and rash.   Neurological: Negative for dizziness, syncope, weakness, light-headedness and headaches.        Past Medical History:   Diagnosis Date   • Hyperlipidemia    • Hypertension      Past Surgical History:   Procedure Laterality Date   • CARDIAC CATHETERIZATION     • CORONARY ARTERY BYPASS GRAFT       No family history on file.  Social History   Substance Use Topics   • Smoking status: Not on file   • Smokeless tobacco: Not on file   • Alcohol use Not on file     No prescriptions prior to admission.     Allergies:  Patient has no known allergies.    Objective      Vital Signs  Heart Rate:  [72] 72  BP: (124)/(80) 124/80    Physical Exam   Constitutional: He is oriented to person, place, and time. He appears well-developed and well-nourished. He appears distressed.   HENT:   Head: Normocephalic and atraumatic.   Eyes: Conjunctivae and EOM are normal. Pupils are equal, round, and reactive to light.   Neck: Normal range of motion. Neck supple.   Cardiovascular: Normal rate and regular rhythm.    Pulmonary/Chest: Effort normal  "and breath sounds normal. No respiratory distress. He has no wheezes. He has no rales.   Abdominal: Soft. Bowel sounds are normal. He exhibits no distension. There is no guarding.   Musculoskeletal: Normal range of motion.   Neurological: He is alert and oriented to person, place, and time.   Skin: Skin is warm and dry. He is not diaphoretic.   Psychiatric: He has a normal mood and affect.       Results Review:   I reviewed the patient's new clinical results.      Assessment/Plan     Principal Problem:    Ischemic cardiomyopathy  Active Problems:    SOB (shortness of breath)      Assessment:  (Ischemia Cardiomyopathy-EF 29% per THOMAS  SOB  HX CABG).     Plan:   NPO.  (Reviewed Lexiscan, pt symptomatic  Admit   Telemetry  Cardiology Consult  Cardiac Enzymes   Will follow clinical course closely  ).       I discussed the patients findings and my recommendations with patient    Angelina Vogel, APRN  05/31/18  4:34 PM    Time: will admit     Plan admit to telemetry  Will need urgent heart cath  Risk vs benefit d/w pt  Appreciate cards input    I have discussed the care of Ramesh Daniels Jr., including pertinent history and exam findings with the ARNP/PA.  I have seen and examined the patient and the key elements of all parts of the encounter have been performed by me. I agree with the assessment and plan as outlined by the ARNP/PA. Please refer to my separate note for complete documentation.     Electronically signed by Derrell Mccann MD on 5/31/2018 at 6:24 PM      Electronically signed by Derrell Mccann MD at 5/31/2018  6:24 PM          Physician Progress Notes (last 72 hours) (Notes from 5/29/2018 11:53 AM through 6/1/2018 11:53 AM)      Ryan Calvillo MD at 6/1/2018 11:23 AM        Please see cardiac cath report from today under the \"cardiology\" tab.    Electronically signed by Ryan Calvillo MD at 6/1/2018 11:23 AM     Derrell Mccann MD at 6/1/2018  7:43 AM          Ramesh Daniels " " is a 58 y.o. male patient.  No further chest pain overnight.  Upon further questioning he has had a rapid decline in his tolerance to activity.  F planned at 9 AM today.      Current Facility-Administered Medications   Medication Dose Route Frequency Provider Last Rate Last Dose   • aspirin EC tablet 81 mg  81 mg Oral Daily Derrell Mccann MD   81 mg at 05/31/18 2006   • clonazePAM (KlonoPIN) tablet 0.5 mg  0.5 mg Oral BID PRN Derrell Mccann MD   0.5 mg at 05/31/18 2006   • clopidogrel (PLAVIX) tablet 75 mg  75 mg Oral Daily Derrell Mccann MD   75 mg at 05/31/18 2007   • cyclobenzaprine (FLEXERIL) tablet 10 mg  10 mg Oral TID PRN Derrell Mccann MD       • enoxaparin (LOVENOX) syringe 40 mg  40 mg Subcutaneous Q24H DONYA Hdez   40 mg at 05/31/18 2008   • lisinopril (PRINIVIL,ZESTRIL) tablet 40 mg  40 mg Oral Daily Derrell Mccann MD   40 mg at 05/31/18 2008   • LORazepam (ATIVAN) injection 0.5 mg  0.5 mg Intravenous Q6H PRN DONYA Hdez       • rosuvastatin (CRESTOR) tablet 10 mg  10 mg Oral Daily Derrell Mccann MD   10 mg at 05/31/18 2007   • sodium chloride 0.9 % flush 1-10 mL  1-10 mL Intravenous PRN Derrell Mccann MD       • sodium chloride 0.9 % infusion  50 mL/hr Intravenous Continuous DONYA Hdez 50 mL/hr at 06/01/18 0650 50 mL/hr at 06/01/18 0650     ALLERGIES:  No Known Allergies  Principal Problem:    Ischemic cardiomyopathy  Active Problems:    SOB (shortness of breath)    Blood pressure 121/68, pulse 68, temperature 97.7 °F (36.5 °C), temperature source Temporal Artery , resp. rate 20, height 182.9 cm (72\"), weight 89.8 kg (197 lb 14.4 oz), SpO2 96 %.      Subjective:  Symptoms:  Stable.  He reports anxiety.  No shortness of breath or chest pain.    Diet:  Adequate intake.    Activity level: Normal.    Pain:  He reports no pain.      Review of Systems   Respiratory: Negative for shortness of breath.    Cardiovascular: " "Negative for chest pain.     Objective:  General Appearance:  Comfortable and well-appearing.    Vital signs: (most recent): Blood pressure 121/68, pulse 68, temperature 97.7 °F (36.5 °C), temperature source Temporal Artery , resp. rate 20, height 182.9 cm (72\"), weight 89.8 kg (197 lb 14.4 oz), SpO2 96 %.  Vital signs are normal.    Output: Producing urine and producing stool.    HEENT: Normal HEENT exam.    Lungs:  Normal effort and normal respiratory rate.    Heart: Normal rate.  Regular rhythm.    Abdomen: Abdomen is soft.  Bowel sounds are normal.     Neurological: Patient is alert and oriented to person, place and time.    Skin:  Warm and dry.              Labs:  Lab Results (last 72 hours)     Procedure Component Value Units Date/Time    BNP [[id number]] Collected:  06/01/18 0717    Specimen:  Blood Updated:  06/01/18 0723    Comprehensive Metabolic Panel [[id number]] Collected:  06/01/18 0717    Specimen:  Blood Updated:  06/01/18 0723    Manual Differential [[id number]] Collected:  06/01/18 0717    Specimen:  Blood Updated:  06/01/18 0723    CBC & Differential [[id number]] Collected:  06/01/18 0717    Specimen:  Blood Updated:  06/01/18 0723    Narrative:       The following orders were created for panel order CBC & Differential.  Procedure                               Abnormality         Status                     ---------                               -----------         ------                     Manual Differential[[id number]]                              In process                 CBC Auto Differential[[id number]]                            In process                   Please view results for these tests on the individual orders.    CBC Auto Differential [[id number]] Collected:  06/01/18 0717    Specimen:  Blood Updated:  06/01/18 0723    Urinalysis With / Microscopic If Indicated (No Culture) - Urine, Clean Catch [[id number]]  (Normal) Collected:  05/31/18 2000    Specimen:  Urine from Urine, Clean Catch " Updated:  05/31/18 2052     Color, UA Yellow     Appearance, UA Clear     pH, UA 5.5     Specific Gravity, UA 1.020     Glucose, UA Negative     Ketones, UA Negative     Bilirubin, UA Negative     Blood, UA Negative     Protein, UA Negative     Leuk Esterase, UA Negative     Nitrite, UA Negative     Urobilinogen, UA 0.2 E.U./dL    Narrative:       Urine microscopic not indicated.    Blood Gas, Arterial [[id number]]  (Abnormal) Collected:  05/31/18 1835    Specimen:  Arterial Blood Updated:  05/31/18 1840     Site Right Radial     Ventura's Test Positive     pH, Arterial 7.425 pH units      pCO2, Arterial 41.0 mm Hg      pO2, Arterial 72.8 (L) mm Hg      HCO3, Arterial 26.9 (H) mmol/L      Base Excess, Arterial 2.2 (H) mmol/L      O2 Saturation, Arterial 95.3 %      Temperature 37.0 C      Barometric Pressure for Blood Gas 746 mmHg      Modality Room Air     Ventilator Mode NA     Collected by [id number]    TSH [[id number]]  (Normal) Collected:  05/31/18 1728    Specimen:  Blood Updated:  05/31/18 1825     TSH 1.940 mIU/mL     T4, Free [[id number]]  (Normal) Collected:  05/31/18 1728    Specimen:  Blood Updated:  05/31/18 1811     Free T4 1.00 ng/dL     BNP [[id number]]  (Normal) Collected:  05/31/18 1728    Specimen:  Blood Updated:  05/31/18 1803     proBNP 92.4 pg/mL     Myoglobin, Serum [[id number]]  (Normal) Collected:  05/31/18 1728    Specimen:  Blood Updated:  05/31/18 1803     Myoglobin 21.0 ng/mL     Lactic Acid, Plasma [[id number]]  (Normal) Collected:  05/31/18 1728    Specimen:  Blood Updated:  05/31/18 1753     Lactate 1.1 mmol/L     Magnesium [[id number]]  (Normal) Collected:  05/31/18 1728    Specimen:  Blood Updated:  05/31/18 1753     Magnesium 2.0 mg/dL     aPTT [[id number]]  (Normal) Collected:  05/31/18 1728    Specimen:  Blood Updated:  05/31/18 1749     PTT 28.0 seconds     Protime-INR [[id number]]  (Abnormal) Collected:  05/31/18 1728    Specimen:  Blood Updated:  05/31/18 1749     Protime 12.4 Seconds       INR 0.90 (L)    CBC Auto Differential [[id number]]  (Abnormal) Collected:  05/31/18 1728    Specimen:  Blood Updated:  05/31/18 1739     WBC 10.97 (H) 10*3/mm3      RBC 5.02 10*6/mm3      Hemoglobin 15.7 g/dL      Hematocrit 45.0 %      MCV 89.6 fL      MCH 31.3 pg      MCHC 34.9 g/dL      RDW 12.2 %      RDW-SD 40.0 fl      MPV 9.7 fL      Platelets 221 10*3/mm3      Neutrophil % 58.0 %      Lymphocyte % 28.6 %      Monocyte % 9.1 %      Eosinophil % 2.7 %      Basophil % 1.1 %      Immature Grans % 0.5 %      Neutrophils, Absolute 6.35 10*3/mm3      Lymphocytes, Absolute 3.14 10*3/mm3      Monocytes, Absolute 1.00 10*3/mm3      Eosinophils, Absolute 0.30 10*3/mm3      Basophils, Absolute 0.12 10*3/mm3      Immature Grans, Absolute 0.06 (H) 10*3/mm3      nRBC 0.0 /100 WBC           Imaging Results (last 72 hours)     ** No results found for the last 72 hours. **                Assessment:    Condition: In stable condition.  Improving.   (Patient Active Problem List:     Ischemic cardiomyopathy     SOB (shortness of breath)    ).     Plan:   Transfer Plan: to cath lab.  Encourage ambulation.  Consults: cardiology.  Regular diet.  (Plan cath today  [facility]  Follow post cath  ).     Patient Active Problem List   Diagnosis   • Ischemic cardiomyopathy   • SOB (shortness of breath)     Derrell Mccann MD  6/1/2018                                                Electronically signed by Derrell Mccann MD at 6/1/2018  7:47 AM          Consult Notes (last 72 hours) (Notes from 5/29/2018 11:53 AM through 6/1/2018 11:53 AM)      Ryan Calvillo MD at 5/31/2018  5:31 PM      Consult Orders:    1. Inpatient Cardiology Consult [[id number]] ordered by DONYA Hdez at 05/31/18 1634                Inpatient Cardiology Consult  Consult performed by: RYAN CALVILLO  Consult ordered by: MAKAYLA DAVIDSON  Reason for consult: Abnormal nuclear stress test      Chief Complaint: Shortness of  breath    HPI: This is a 58-year-old male with coronary artery disease with previous coronary artery bypass grafting in 2008, hypertension, hyperlipidemia who presents for further evaluation after an abnormal nuclear stress test.  The patient says that over the past month or so, he has had aggressive shortness of breath and dyspnea on exertion.  Also, the patient describes paroxysmal nocturnal dyspnea, episodes where he will get up abruptly at night short of breath, having to go outside and walk around to relieve his shortness of breath.  The patient has occasional wheezing but nothing significant.  The patient denies any significant chest discomfort.  He has noted some generalized weakness and fatigue and decreased exercise tolerance.  The patient says that he has not seen a cardiologist in a number of years.  He says that he did undergo coronary artery bypass grafting after finding significant coronary artery disease 10 years ago.  In general, the patient says that his blood pressure is well-controlled.  He has not had any trouble with his medications.  He has remained on both aspirin and Plavix since his surgery.  He has not had any bleeding difficulties.  The patient denies any recent fevers, chills, cough.  He denies any significant palpitations and denies lightheadedness, dizziness, syncope.  He did undergo a nuclear stress test today as part of his workup for his shortness of breath which revealed a depressed left ventricular ejection fraction with evidence of previous anteroapical infarction with suspicion for ischemia in the inferior and lateral walls.  Currently, while at rest, the patient says that his breathing is okay and he is not short of breath.    Past Medical History:   Diagnosis Date   • CAD (coronary artery disease)    • ED (erectile dysfunction)    • Hyperlipidemia    • Hypertension      Past Surgical History:   Procedure Laterality Date   • CARDIAC CATHETERIZATION     • CORONARY ARTERY BYPASS  GRAFT     • HAND SURGERY     • LAMINECTOMY       Current Facility-Administered Medications on File Prior to Encounter   Medication Dose Route Frequency Provider Last Rate Last Dose   • [COMPLETED] regadenoson (LEXISCAN) injection 0.4 mg  0.4 mg Intravenous Once Ryan Lord MD   0.4 mg at 05/31/18 0935   • [COMPLETED] technetium sestamibi (CARDIOLITE) injection 1 dose  1 dose Intravenous Once in imaging Derrell Henderson MD   1 dose at 05/31/18 0809   • [COMPLETED] technetium sestamibi (CARDIOLITE) injection 1 dose  1 dose Intravenous Once in imaging Derrell Henderson MD   1 dose at 05/31/18 1000     Current Outpatient Prescriptions on File Prior to Encounter   Medication Sig Dispense Refill   • aspirin 81 MG EC tablet Take 81 mg by mouth Daily.     • clonazePAM (KlonoPIN) 0.5 MG tablet Take 0.5 mg by mouth 2 (Two) Times a Day As Needed for Seizures.     • clopidogrel (PLAVIX) 75 MG tablet Take 75 mg by mouth Daily.     • lisinopril (PRINIVIL,ZESTRIL) 40 MG tablet Take 40 mg by mouth Daily.     • Rosuvastatin Calcium (CRESTOR PO) Take  by mouth Daily.       Allergies: No Known Allergies     Social History   Substance Use Topics   • Smoking status: Never Smoker   • Smokeless tobacco: Never Used   • Alcohol use Yes     Family History   Problem Relation Age of Onset   • Coronary artery disease Father    • Coronary artery disease Brother      Review of Systems   Constitution: Positive for malaise/fatigue. Negative for chills, fever, night sweats and weight loss.   HENT: Negative for congestion and hearing loss.    Eyes: Negative for blurred vision and pain.   Cardiovascular: Positive for dyspnea on exertion and paroxysmal nocturnal dyspnea. Negative for chest pain, claudication, irregular heartbeat, leg swelling, orthopnea, palpitations and syncope.   Respiratory: Positive for shortness of breath. Negative for cough, hemoptysis and wheezing.    Endocrine: Negative for cold intolerance, heat intolerance,  "polydipsia and polyuria.   Hematologic/Lymphatic: Negative for adenopathy and bleeding problem. Does not bruise/bleed easily.   Skin: Negative for color change, poor wound healing and rash.   Musculoskeletal: Positive for arthritis and back pain. Negative for joint swelling, myalgias and neck pain.   Gastrointestinal: Negative for abdominal pain, change in bowel habit, constipation, diarrhea, heartburn, hematochezia, melena, nausea and vomiting.   Genitourinary: Negative for bladder incontinence, dysuria, frequency, hematuria and nocturia.   Neurological: Negative for dizziness, focal weakness, headaches, light-headedness, loss of balance, numbness and seizures.   Psychiatric/Behavioral: Negative for altered mental status, memory loss and substance abuse.   Allergic/Immunologic: Negative for hives and persistent infections.     Physical Exam:    /83 (BP Location: Left arm, Patient Position: Sitting)   Pulse 71   Temp 98.6 °F (37 °C) (Temporal Artery )   Resp 22   Ht 182.9 cm (72\")   Wt 89.8 kg (197 lb 14.4 oz)   SpO2 94%   BMI 26.84 kg/m²      Physical Exam   Constitutional: He is oriented to person, place, and time. Vital signs are normal. He appears well-developed and well-nourished. He is cooperative.  Non-toxic appearance. No distress.   HENT:   Head: Normocephalic and atraumatic.   Right Ear: External ear normal.   Left Ear: External ear normal.   Nose: Nose normal.   Mouth/Throat: Uvula is midline, oropharynx is clear and moist and mucous membranes are normal. Mucous membranes are not pale, not dry and not cyanotic. No oropharyngeal exudate.   Eyes: EOM and lids are normal. Pupils are equal, round, and reactive to light.   Neck: Normal range of motion. Neck supple. No hepatojugular reflux and no JVD present. Carotid bruit is not present. No tracheal deviation and no edema present. No thyroid mass and no thyromegaly present.   Cardiovascular: Normal rate, regular rhythm, S1 normal, S2 normal, " normal heart sounds, intact distal pulses and normal pulses.   No extrasystoles are present. PMI is not displaced.  Exam reveals no gallop and no friction rub.    No murmur heard.  Pulses:       Radial pulses are 2+ on the right side, and 2+ on the left side.        Femoral pulses are 2+ on the right side, and 2+ on the left side.       Dorsalis pedis pulses are 2+ on the right side, and 2+ on the left side.        Posterior tibial pulses are 2+ on the right side, and 2+ on the left side.   Pulmonary/Chest: Effort normal and breath sounds normal. No accessory muscle usage. No respiratory distress. He has no wheezes. He has no rales. He exhibits no tenderness.   Abdominal: Soft. Normal appearance and bowel sounds are normal. He exhibits no distension, no abdominal bruit and no pulsatile midline mass. There is no hepatosplenomegaly. There is no tenderness.   Musculoskeletal: Normal range of motion. He exhibits no edema, tenderness or deformity.   Lymphadenopathy:     He has no cervical adenopathy.   Neurological: He is oriented to person, place, and time. He has normal strength. No cranial nerve deficit.   Skin: Skin is warm, dry and intact. No rash noted. He is not diaphoretic. No cyanosis or erythema. Nails show no clubbing.   Psychiatric: He has a normal mood and affect. His speech is normal and behavior is normal. Thought content normal.   Nursing note and vitals reviewed.    Diagnostic Data:    CXR: No radiographic evidence of acute cardiopulmonary process.    Nuclear stress today:  · Left ventricular ejection fraction is severely reduced (Calculated EF = 29%).  · Myocardial perfusion imaging indicates a large-sized infarct located in the anterior wall and apex with no significant ischemia noted.  · Myocardial perfusion imaging indicates a medium-sized, moderately severe area of ischemia located in the inferior wall and lateral wall.  · Impressions are consistent with a high risk study.    ASSESSMENT/PLAN:    1.  Shortness of breath - concerning for possible acute, likely on chronic systolic CHF/ischemic cardiomyopathy  2. Paroxysmal nocturnal dyspnea  3. CAD in native artery  4. Essential hypertension  5. Mixed hyperlipidemia    - This patient's clinical history is consistent with congestive heart failure type symptoms. On exam, the patient appears to be euvolemic at this time with no evidence of volume overload.  The chronicity of his left ventricular systolic function is unknown although it is newly diagnosed.  The patient does tell me that he was told by his previous surgeon that his left ventricular ejection fraction was somewhat depressed, however I cannot find records at this time.  With the findings of his nuclear stress test showing potential ischemia, I think that cardiac catheterization for further assessment is warranted at this time.  I will recommend for the patient to be nothing by mouth after midnight with plans for cardiac catheterization tomorrow.  We will plan left and right heart catheterizations to further evaluate.  The procedures, risks, benefits, alternatives were explained in detail with the patient today.  He is agreeable to proceed.  He knows to remains nothing by mouth after midnight.  - Recommend to continue home medications at this time.  - Further plans will be pending the patient's cardiac catheterization tomorrow.  We will consider potential LifeVest at discharge, pending the patient's evaluation.  - Thank you for this consultation.        Electronically signed by Ryan Calvillo MD at 5/31/2018  5:46 PM       [facility] CARDIOLOGY  [address] [place] [id number] [phone]             Ramesh Daniels Jr.   Regadenoson Stress Test With Myocardial Perfusion SPECT (Multi Study)   Order# [id number]   Reading physician: Ryan Lord MD Ordering physician: Derrell Henderson MD Study date: 5/31/18   Patient Information     Patient Name  Ramesh Daniels Jr.  MRN  [id number] Sex  Male  (Age)  1960 (58 y.o.)   Interpretation Summary        · Left ventricular ejection fraction is severely reduced (Calculated EF = 29%).  · Myocardial perfusion imaging indicates a large-sized infarct located in the anterior wall and apex with no significant ischemia noted.  · Myocardial perfusion imaging indicates a medium-sized, moderately severe area of ischemia located in the inferior wall and lateral wall.  · Impressions are consistent with a high risk study.     SEVERE ISCHEMIC CARDIOMYOPATHY  PREVIOUS LARGE ANTERIOR MI WITH PROBABLE ONGOING ISCHEMIA IN THE INFERO-LATERAL WALL      Patient Height & Weight     Height Weight BSA (Calculated - sq m) BMI (kg/m2) Pulse       72   Stress Data     Stage HR (bpm) BP (mmHg) Minutes Seconds Dose (mg) Comments   1        86        118/81        0        10        0.4        10 sec bolus injection          Stress Measurements     Baseline Vitals   Baseline HR 70 bpm      Baseline /71 mmHg       Peak Stress Vitals   Peak HR 86 bpm      Peak /81 mmHg       Recovery Vitals   Recovery HR 85 bpm      Recovery /70 mmHg       Exercise Data   Target HR (85%) 137 bpm      Max. Pred. HR (100%) 161 bpm      Percent Max Pred HR 53.42 %      Exercise duration (sec) 10 sec         Study Description     Nuclear Study Description A 1-day rest/stress protocol myocardial perfusion imaging study was performed. While at rest, the patient was injected intravenously with 11 mCi of technetium sestamibi . 0.4 mg / 5 mL of regadenoson given intravenously over approximately 10 seconds. While at peak stress, the patient was injected intravenously with 32 mCi of technetium sestamibi . The total amount of radiation received in the study is about 13.02 mSv.No prior studies were available for comparison      Nuclear Perfusion Images Overall image quality is good.      Imaging Contrast/Medications:     technetium sestamibi (CARDIOLITE) injection 1 dose       Given: 1 dose Intravenous   technetium sestamibi (CARDIOLITE) injection 1 dose      Given: 1 dose Intravenous   Stress Procedure     Rest ECG Baseline ECG of normal sinus rhythm noted. Non-specific ST-T wave changes noted.   There was no ST segment deviation noted.      Stress ECG Stress ECG of normal sinus rhythm noted. Non-specific ST-T wave changes noted.   There was no ST segment deviation noted during stress.   There were no arrhythmias during stress.   There were no arrhythmias during recovery.   There were no significant arrhythmias noted during the test.   ECG was interpretable.      Stress Description A pharmacological stress test was performed using regadenoson without low-level exercise.   The patient reached the end of the protocol.   The patient reported dizziness and headache during the stress test. Symptoms were not clinically significant.   Blood pressure demonstrated a normal response to stress. Heart rate demonstrated a normal response to stress.      Nuclear Perfusion Findings     Study Impression Myocardial perfusion imaging indicates a large-sized infarct located in the anterior wall and apex with no significant ischemia noted. Myocardial perfusion imaging indicates a medium-sized, moderately severe area of ischemia located in the inferior wall and lateral wall. Impressions are consistent with a high risk study.      Rest Perfusion Defect 1 There is a large sized defect present in the basal anterior, mid anterior, apical anterior and apical inferior wall.      Stress Perfusion Defect 1 There is a defect present in the basal anterior, basal lateral, basal inferior, basal anterolateral, mid lateral, mid inferior, mid anterolateral, apical anterior, apical septal, apical inferior, apical lateral and apex wall.      Ventricle Size / Description Left ventricular ejection fraction is severely reduced (Calculated EF = 29%). Enlarged LV cavity size. Abnormal LV wall motion consistent with severe  hypokinesis of the anterior wall.      PACS Images     Show images for Stress Test With Myocardial Perfusion - One Day   Signed     Electronically signed by Ryan Lord MD on 18 at 1602 CDT   Printable Result Report     Result Report    Encounter     View Encounter          Results Routing Tracking     View Results Routing Information   Order-Level Documents:     Scan on 2018 12:02 PM by Vianey Mackey RN : ekgScan on 2018 12:02 PM by Vianey Mackey RN : ekg     [facility] CATH LAB  [address] [place] [id number] [phone]             Ramesh Daniels    Cardiac Catheterization/Vascular Study   Order# [id number]   Reading physician: Ryan Calvillo MD Ordering physician: Ryan Calvillo MD Study date: 18    Procedures     Left Heart Cath, coronaries, grafts, possible   Right Heart Cath      Patient Information     Patient Name  Ramesh Daniels Jr. MRN  [id number] Sex  Male  (Age)  1960 (58 y.o.)   Race Ethnicity Encounter Category   White or  Not  or  Emergency   Physicians     Panel Physicians Referring Physician Case Authorizing Physician   Ryan Calvillo MD (Primary) MD Ryan Sterling MD   Indications     Ischemic cardiomyopathy [I25.5 (ICD-10-CM)]       Conclusion     Date of Procedure: 18     Procedures Performed:     1. Selective coronary angiography  2. Bypass graft angiography  3. Right heart catheterization  4. Administration and monitoring of conscious sedation during the procedure     Indication: Shortness of breath, PND, abnormal nuclear stress test with left ventricular systolic dysfunction     Access: 6 [place] RFA - Perclose, 7 [place] LFV - manual  Diagnostic Catheters: 6 [place] JL4, 6 [place] JR 4 (for SVG and ERIC), 6 [place] MP for SVG to PDA     Procedural Details: After written and informed consent was obtained, the patient was brought to the cardiac  catheterization lab in a fasting state. The skin overlying the patient's right and left groins was prepped and draped in the usual sterile fashion. Timeout was taken to confirm the correct patient and procedure. IV Versed and fentanyl were used to achieve conscious sedation. Lidocaine was administered for local anesthesia over the right femoral artery. Attempts were made to access the right femoral artery but were unsuccessful (heavy calcification or scar tissue); therefore, this access site was aborted and attention then turned to left femoral area.  Local anesthesia was administered.  Modified Seldinger technique was then used to place a 6 Danish sheath in the left femoral artery and a 7 Danish sheath in the left femoral vein.   After this, a 7 Danish [place]-Jesus catheter was advanced easily into the right atrium, right ventricle, pulmonary artery and pulmonary capillary wedge positions.  Oximetry measurements were obtained from the arterial sheath and the pulmonary artery position.  Thermodilution cardiac output was assessed.  After all measurements and hemodynamics were obtained, the catheter was then removed. Diagnostic coronary angiography was then performed. Multiple hand injected angiograms were performed. Bypass graft angiography was then performed.  Upon completion of all procedures, a femoral angiogram and was performed and revealed arteriotomy site suitable for a closure device. A 6 Danish Angioseal was used to achieve hemostasis. The patient tolerated the procedures well. There were no immediate complications.  During the procedure, I supervised the administration and monitoring of conscious sedation.  This included monitoring of the patient's status, oximetry, hemodynamics.  The patient received the first dose of IV sedation at 9:52 AM and I left the room at 10:37AM, accounting for a total of 45 minutes of face-to-face time with the patient in the cath lab today.     Results:     Selective Coronary  Angiography:     Left Main Coronary Artery: The left main coronary artery arises from the left coronary cusp and is a short vessel that currently gives rise to what appears to be a ramus intermedius and a left circumflex, while the LAD has been occluded at the ostium.  The left main coronary artery is short and has no significant disease.     Left Anterior Descending Artery: The left anterior descending artery arises from the distal left main coronary artery and is occluded in the ostial segment.     Ramus intermedius: Ramus intermedius arises from the distal left main coronary artery and is an extremely small caliber diffusely diseased vessel.     Left Circumflex: The left circumflex artery arises from the distal left main artery and supplies a short segment that is diffusely diseased, then giving rise to a small diffusely diseased obtuse marginal branch and an atrial branch and then is occluded.     Right Coronary Artery: The right coronary artery arises from the right coronary cusp and is occluded in its proximal segment.     Bypass Graft Angiography:     Saphenous vein graft to the ramus intermedius: This bypass graft is occluded in its ostial segment.     Saphenous vein graft to the obtuse marginal: The saphenous vein graft is widely patent with no significant stenosis in the body of the graft.  The graft then fills what is likely a first obtuse marginal branch and then retrograde fills 2 additional marginal branches.  There is no significant focal stenoses in any of these vessels although they have diffuse nonobstructive disease.     Saphenous vein graft to the right coronary artery/posterior descending artery: The saphenous vein graft to the posterior descending artery is widely patent with no significant stenosis throughout the body of the graft.  After the insertion of the graft, the posterior descending artery is extremely small caliber and diffusely diseased and there are multiple posterolateral branches  that are present, also extremely small caliber diffusely diseased.     Left internal mammary artery to left anterior descending artery: The left internal mammary artery bypass graft is widely patent.  This inserts into the midportion of the LAD.  The LAD then continues to the apex as a small caliber diffusely diseased vessels, supplying collateral flow to the occluded right coronary artery.  Retrograde, this graft fills a diffusely diseased mid left anterior descending artery and multiple small caliber diffusely diseased diagonal branches.  Once again, the septal perforators are seen filling via left to right collateral flow, the right coronary artery to some degree.     Right Heart Catheterization:     Hemodynamics:     RA: <5 mmHg  RV: 25/<5 mmHg  PA: 20/10 mmHg  PCWP: 5 mmHg     Oximetry:     Aortic: 97 %  PA: 75%     Cardiac Output/Index:     Ronnie: 6.6 L/min/3.1 L/min/m2  Thermodilution: 5.6 L/m/2.64 L/min/m²     Total contrast: 100 mL     Fluoroscopy time: 4.5 min     X-ray exposure: 475 mGy     Impression:  1.  Severe native multivessel coronary artery disease.  2.  Occluded saphenous vein graft to the ramus intermedius, occluded at least since 2011.  3.  Patent left internal mammary to left anterior descending artery.  4.  Patent saphenous vein graft to posterior descending artery, however the posterior descending artery is with severe disease, not amenable to intervention.  5.  Patent left internal mammary to obtuse marginal.  6.  Normal right heart filling pressures and normal cardiac output and index.  7.  Ischemic cardiomyopathy with left ventricular ejection fraction 29% by nuclear stress test yesterday (previously 35-40 percent by previous cardiac catheterization in 2011).     Plan:  1.  Routine post procedure orders, including 4 hours bed rest, gentle IV fluids.  2.  I discussed the case with Dr. Mccann today.  At this time, the patient does have an ischemic cardiomyopathy with no significant targets  for revascularization.  His hemodynamics are encouraging as the patient has preserved cardiac output and index with normal right heart filling pressures.  Long-term, medical optimization will be recommended and we will add low-dose beta blocker therapy today along with continuation of ACE inhibitor therapy.  We will also place an order for a LifeVest for this patient.  We would like to see the patient back in one month in our nurse practitioner office to reevaluate symptoms.  We will also plan a repeat echocardiogram in 3 months to reevaluate left ventricular systolic function on optimal medical therapy.  If left ventricular systolic function remains at less than 35%, the patient will likely be a candidate for a primary prevention ICD.  3.  The patient is likely stable for discharge home today given the findings of his cardiac catheterization, as long as he has no post cath complications today.   Radiation      Event Details User   10:38 AM Radiation Tracking Cumulative Air Kerma: Total Dose (mGy) = 475.000  Physician: Ryan Calvillo MD  Dose (mGy) = 475.000  Fluoro Time (mins) = 4.5  DAP (Gy-cm2) = 18213.000 KM   Medications   As of 06/01/18 1048   (Filter:  CV CONTRAST GROUPER Medications Shown)   iopamidol (ISOVUE-370) 76 % injection (mL)   Total dose:  150 mL                       Printable Result Report     Result Report    Encounter     View Encounter          PACS Images     Show images for Cardiac Catheterization/Vascular Study   Signed     Electronically signed by Ryan Calvillo MD on 6/1/18 at 1113 CDT   Encounter-Level Cardiology Documents:     There are no encounter-level cardiology documents.    Link to Procedure Log     Procedure Log      Order-Level Documents:     Scan on 6/1/2018 12:00 AMScan on 6/1/2018 12:00 AM          Results Routing Tracking     View Results Routing Information

## 2018-06-01 NOTE — DISCHARGE SUMMARY
Hospital Discharge Summary    Ramesh Daniels Jr.  :  1960  MRN:  [id number]    Admit date:  2018  Discharge date:  2018    Admitting Physician:    Derrell Henderson MD    Discharge Diagnoses:    Principal Problem:    Ischemic cardiomyopathy  Active Problems:    SOB (shortness of breath)      Hospital Course:   The patient was admitted for the above surgical/medical indication.  Please see admission H&P for further details concerning the admission.  The patient was seen daily and progress noted via daily updates in the progress notes.  The patient improved throughout her stay.  They reached maximum medical improvement and were considered stable for discharge home.  They understand the importance of follow-up concerning any abnormal lab values/x-rays.  All questions were answered to the best of my ability prior to their discharge home.  Admitted + nuclear stress test. Cardiology eval=cath. Cath ok-cleared by cards for D/C. Will get life vest. Ok to d/c if all arranged      Discharge Medications:       Ramesh Daniels JrNelson   Home Medication Instructions AMBAR:[id number]    Printed on:18 1218   Medication Information                      aspirin 81 MG EC tablet  Take 81 mg by mouth Daily.             clonazePAM (KlonoPIN) 0.5 MG tablet  Take 0.5 mg by mouth 2 (Two) Times a Day As Needed for Seizures.             clopidogrel (PLAVIX) 75 MG tablet  Take 75 mg by mouth Daily.             cyclobenzaprine (FLEXERIL) 10 MG tablet  Take 10 mg by mouth 3 (Three) Times a Day As Needed for Muscle Spasms.             lisinopril (PRINIVIL,ZESTRIL) 40 MG tablet  Take 40 mg by mouth Daily.             Rosuvastatin Calcium (CRESTOR PO)  Take  by mouth Daily.                 Consults: Cardiology    Significant Diagnostic Studies:      EKG: normal EKG, normal sinus rhythm, unchanged from previous tracings.      Treatments:   Heart Cath    Disposition:   Home if all arranged  Follow up with Derrell Henderson MD in  next week.    Signed:  Derrell Mccann MD   6/1/2018, 12:18 PM

## 2018-06-01 NOTE — PROGRESS NOTES
"Ramesh Daniels Jr. is a 58 y.o. male patient.  No further chest pain overnight.  Upon further questioning he has had a rapid decline in his tolerance to activity.  F planned at 9 AM today.      Current Facility-Administered Medications   Medication Dose Route Frequency Provider Last Rate Last Dose   • aspirin EC tablet 81 mg  81 mg Oral Daily Derrell Mccann MD   81 mg at 05/31/18 2006   • clonazePAM (KlonoPIN) tablet 0.5 mg  0.5 mg Oral BID PRN Derrell Mccann MD   0.5 mg at 05/31/18 2006   • clopidogrel (PLAVIX) tablet 75 mg  75 mg Oral Daily Derrell Mcacnn MD   75 mg at 05/31/18 2007   • cyclobenzaprine (FLEXERIL) tablet 10 mg  10 mg Oral TID PRN Derrell Mccann MD       • enoxaparin (LOVENOX) syringe 40 mg  40 mg Subcutaneous Q24H DONYA Hdez   40 mg at 05/31/18 2008   • lisinopril (PRINIVIL,ZESTRIL) tablet 40 mg  40 mg Oral Daily Derrell Mccann MD   40 mg at 05/31/18 2008   • LORazepam (ATIVAN) injection 0.5 mg  0.5 mg Intravenous Q6H PRN DONYA Hdez       • rosuvastatin (CRESTOR) tablet 10 mg  10 mg Oral Daily Derrell Mccann MD   10 mg at 05/31/18 2007   • sodium chloride 0.9 % flush 1-10 mL  1-10 mL Intravenous PRN Derrell Mccann MD       • sodium chloride 0.9 % infusion  50 mL/hr Intravenous Continuous DONYA Hdez 50 mL/hr at 06/01/18 0650 50 mL/hr at 06/01/18 0650     ALLERGIES:  No Known Allergies  Principal Problem:    Ischemic cardiomyopathy  Active Problems:    SOB (shortness of breath)    Blood pressure 121/68, pulse 68, temperature 97.7 °F (36.5 °C), temperature source Temporal Artery , resp. rate 20, height 182.9 cm (72\"), weight 89.8 kg (197 lb 14.4 oz), SpO2 96 %.      Subjective:  Symptoms:  Stable.  He reports anxiety.  No shortness of breath or chest pain.    Diet:  Adequate intake.    Activity level: Normal.    Pain:  He reports no pain.      Review of Systems   Respiratory: Negative for shortness of breath.  " "  Cardiovascular: Negative for chest pain.     Objective:  General Appearance:  Comfortable and well-appearing.    Vital signs: (most recent): Blood pressure 121/68, pulse 68, temperature 97.7 °F (36.5 °C), temperature source Temporal Artery , resp. rate 20, height 182.9 cm (72\"), weight 89.8 kg (197 lb 14.4 oz), SpO2 96 %.  Vital signs are normal.    Output: Producing urine and producing stool.    HEENT: Normal HEENT exam.    Lungs:  Normal effort and normal respiratory rate.    Heart: Normal rate.  Regular rhythm.    Abdomen: Abdomen is soft.  Bowel sounds are normal.     Neurological: Patient is alert and oriented to person, place and time.    Skin:  Warm and dry.              Labs:  Lab Results (last 72 hours)     Procedure Component Value Units Date/Time    BNP [[id number]] Collected:  06/01/18 0717    Specimen:  Blood Updated:  06/01/18 0723    Comprehensive Metabolic Panel [[id number]] Collected:  06/01/18 0717    Specimen:  Blood Updated:  06/01/18 0723    Manual Differential [[id number]] Collected:  06/01/18 0717    Specimen:  Blood Updated:  06/01/18 0723    CBC & Differential [[id number]] Collected:  06/01/18 0717    Specimen:  Blood Updated:  06/01/18 0723    Narrative:       The following orders were created for panel order CBC & Differential.  Procedure                               Abnormality         Status                     ---------                               -----------         ------                     Manual Differential[[id number]]                              In process                 CBC Auto Differential[[id number]]                            In process                   Please view results for these tests on the individual orders.    CBC Auto Differential [[id number]] Collected:  06/01/18 0717    Specimen:  Blood Updated:  06/01/18 0723    Urinalysis With / Microscopic If Indicated (No Culture) - Urine, Clean Catch [[id number]]  (Normal) Collected:  05/31/18 2000    Specimen:  Urine from " Urine, Clean Catch Updated:  05/31/18 2052     Color, UA Yellow     Appearance, UA Clear     pH, UA 5.5     Specific Gravity, UA 1.020     Glucose, UA Negative     Ketones, UA Negative     Bilirubin, UA Negative     Blood, UA Negative     Protein, UA Negative     Leuk Esterase, UA Negative     Nitrite, UA Negative     Urobilinogen, UA 0.2 E.U./dL    Narrative:       Urine microscopic not indicated.    Blood Gas, Arterial [[id number]]  (Abnormal) Collected:  05/31/18 1835    Specimen:  Arterial Blood Updated:  05/31/18 1840     Site Right Radial     Ventura's Test Positive     pH, Arterial 7.425 pH units      pCO2, Arterial 41.0 mm Hg      pO2, Arterial 72.8 (L) mm Hg      HCO3, Arterial 26.9 (H) mmol/L      Base Excess, Arterial 2.2 (H) mmol/L      O2 Saturation, Arterial 95.3 %      Temperature 37.0 C      Barometric Pressure for Blood Gas 746 mmHg      Modality Room Air     Ventilator Mode NA     Collected by [id number]    TSH [[id number]]  (Normal) Collected:  05/31/18 1728    Specimen:  Blood Updated:  05/31/18 1825     TSH 1.940 mIU/mL     T4, Free [[id number]]  (Normal) Collected:  05/31/18 1728    Specimen:  Blood Updated:  05/31/18 1811     Free T4 1.00 ng/dL     BNP [[id number]]  (Normal) Collected:  05/31/18 1728    Specimen:  Blood Updated:  05/31/18 1803     proBNP 92.4 pg/mL     Myoglobin, Serum [[id number]]  (Normal) Collected:  05/31/18 1728    Specimen:  Blood Updated:  05/31/18 1803     Myoglobin 21.0 ng/mL     Lactic Acid, Plasma [[id number]]  (Normal) Collected:  05/31/18 1728    Specimen:  Blood Updated:  05/31/18 1753     Lactate 1.1 mmol/L     Magnesium [[id number]]  (Normal) Collected:  05/31/18 1728    Specimen:  Blood Updated:  05/31/18 1753     Magnesium 2.0 mg/dL     aPTT [[id number]]  (Normal) Collected:  05/31/18 1728    Specimen:  Blood Updated:  05/31/18 1749     PTT 28.0 seconds     Protime-INR [[id number]]  (Abnormal) Collected:  05/31/18 1728    Specimen:  Blood Updated:  05/31/18 1749     Protime  12.4 Seconds      INR 0.90 (L)    CBC Auto Differential [[id number]]  (Abnormal) Collected:  05/31/18 1728    Specimen:  Blood Updated:  05/31/18 1739     WBC 10.97 (H) 10*3/mm3      RBC 5.02 10*6/mm3      Hemoglobin 15.7 g/dL      Hematocrit 45.0 %      MCV 89.6 fL      MCH 31.3 pg      MCHC 34.9 g/dL      RDW 12.2 %      RDW-SD 40.0 fl      MPV 9.7 fL      Platelets 221 10*3/mm3      Neutrophil % 58.0 %      Lymphocyte % 28.6 %      Monocyte % 9.1 %      Eosinophil % 2.7 %      Basophil % 1.1 %      Immature Grans % 0.5 %      Neutrophils, Absolute 6.35 10*3/mm3      Lymphocytes, Absolute 3.14 10*3/mm3      Monocytes, Absolute 1.00 10*3/mm3      Eosinophils, Absolute 0.30 10*3/mm3      Basophils, Absolute 0.12 10*3/mm3      Immature Grans, Absolute 0.06 (H) 10*3/mm3      nRBC 0.0 /100 WBC           Imaging Results (last 72 hours)     ** No results found for the last 72 hours. **                Assessment:    Condition: In stable condition.  Improving.   (Patient Active Problem List:     Ischemic cardiomyopathy     SOB (shortness of breath)    ).     Plan:   Transfer Plan: to cath lab.  Encourage ambulation.  Consults: cardiology.  Regular diet.  (Plan cath today  [facility]  Follow post cath  ).     Patient Active Problem List   Diagnosis   • Ischemic cardiomyopathy   • SOB (shortness of breath)     Derrell Mccann MD  6/1/2018

## 2018-07-02 ENCOUNTER — OFFICE VISIT (OUTPATIENT)
Dept: CARDIOLOGY | Facility: CLINIC | Age: 58
End: 2018-07-02

## 2018-07-02 VITALS
OXYGEN SATURATION: 98 % | HEIGHT: 72 IN | SYSTOLIC BLOOD PRESSURE: 124 MMHG | WEIGHT: 202.6 LBS | BODY MASS INDEX: 27.44 KG/M2 | DIASTOLIC BLOOD PRESSURE: 72 MMHG | HEART RATE: 87 BPM

## 2018-07-02 DIAGNOSIS — I10 ESSENTIAL HYPERTENSION: ICD-10-CM

## 2018-07-02 DIAGNOSIS — I25.5 ISCHEMIC CARDIOMYOPATHY: Primary | ICD-10-CM

## 2018-07-02 DIAGNOSIS — E78.2 MIXED HYPERLIPIDEMIA: ICD-10-CM

## 2018-07-02 DIAGNOSIS — I50.22 CHRONIC SYSTOLIC HEART FAILURE (HCC): ICD-10-CM

## 2018-07-02 DIAGNOSIS — I25.708 CORONARY ARTERY DISEASE OF BYPASS GRAFT OF NATIVE HEART WITH STABLE ANGINA PECTORIS (HCC): ICD-10-CM

## 2018-07-02 PROCEDURE — 99214 OFFICE O/P EST MOD 30 MIN: CPT | Performed by: NURSE PRACTITIONER

## 2018-07-02 RX ORDER — ROSUVASTATIN CALCIUM 20 MG/1
20 TABLET, COATED ORAL DAILY
COMMUNITY

## 2018-07-02 NOTE — PATIENT INSTRUCTIONS

## 2018-07-02 NOTE — PROGRESS NOTES
"    Subjective:     Encounter Date:07/02/2018      Patient ID: Ramesh Daniels Jr. is a 58 y.o. male with a history of CAD s/p CABG, ischemic cardiomyopathy, left ventricular systolic heart failure with EF last noted to be 29% per catherization, hypertension, and hyperlipidemia.  He presents today for discharge follow up, following his last heart catherization.     Chief Complaint: Follow up   Cardiomyopathy   This is a chronic problem. The current episode started more than 1 year ago. Associated symptoms include chest pain (described as \"Cramps\" across his chest from time to time). Pertinent negatives include no abdominal pain, chills, coughing, diaphoresis, fatigue, fever, headaches, joint swelling, myalgias, nausea, vomiting or weakness.   Congestive Heart Failure   Presents for follow-up visit. Associated symptoms include chest pain (described as \"Cramps\" across his chest from time to time). Pertinent negatives include no abdominal pain, chest pressure, edema, fatigue, muscle weakness, near-syncope, nocturia, orthopnea, palpitations, paroxysmal nocturnal dyspnea, shortness of breath or unexpected weight change. The symptoms have been stable. His past medical history is significant for CAD.   Coronary Artery Disease   Presents for follow-up visit. Symptoms include chest pain (described as \"Cramps\" across his chest from time to time) and dizziness (orthostatic intermittently). Pertinent negatives include no chest pressure, leg swelling, muscle weakness, palpitations, shortness of breath or weight gain. His past medical history is significant for CHF. The symptoms have been stable. Compliance with diet is good. Compliance with exercise is good. Compliance with medications is good.     Mr. Daniels is doing well following discharge.  He is being treated for his ischemic cardiomyopathy and placed on optimal medical therapy due to his low EF. He was started on a beta blocker at discharge and is tolerating that well. He has " had elvin angina since discharge but has felt better with the addition of the beta blocker.  He is due for follow up imaging the beginning of September.  Then re evaluated for ICD.  He was ordered to have LIfeVest placed, although this was denied by his insurance.  He is compensated without any signs of CHF. He denies orthopnea, PND, or edema.  He has some orthostatic dizziness when he quickly stands up.  His angina has improved with the addition of the beta blocker. This is occurring less often, described as cramping.     The following portions of the patient's history were reviewed and updated as appropriate: allergies, current medications, past family history, past medical history, past social history and past surgical history.     No Known Allergies    Current outpatient and discharge medications have been reconciled for the patient.  Reviewed by: DONYA Villareal      Current Outpatient Prescriptions:   •  aspirin 81 MG EC tablet, Take 81 mg by mouth Daily., Disp: , Rfl:   •  clonazePAM (KlonoPIN) 0.5 MG tablet, Take 0.5 mg by mouth At Night As Needed for Seizures., Disp: , Rfl:   •  clopidogrel (PLAVIX) 75 MG tablet, Take 75 mg by mouth Daily., Disp: , Rfl:   •  cyclobenzaprine (FLEXERIL) 10 MG tablet, Take 10 mg by mouth 3 (Three) Times a Day As Needed for Muscle Spasms., Disp: , Rfl:   •  lisinopril (PRINIVIL,ZESTRIL) 40 MG tablet, Take 40 mg by mouth Daily., Disp: , Rfl:   •  metoprolol tartrate (LOPRESSOR) 25 MG tablet, Take 1 tablet by mouth Every 12 (Twelve) Hours., Disp: 60 tablet, Rfl: 5  •  rosuvastatin (CRESTOR) 20 MG tablet, Take 20 mg by mouth Daily., Disp: , Rfl:     Past Medical History:   Diagnosis Date   • CAD (coronary artery disease)    • ED (erectile dysfunction)    • Hyperlipidemia    • Hypertension      Family History   Problem Relation Age of Onset   • Coronary artery disease Father    • Coronary artery disease Brother    • Heart attack Brother      Social History     Social History   •  "Marital status:      Spouse name: N/A   • Number of children: N/A   • Years of education: N/A     Occupational History   • Not on file.     Social History Main Topics   • Smoking status: Never Smoker   • Smokeless tobacco: Never Used   • Alcohol use Yes      Comment: occ   • Drug use: No   • Sexual activity: Defer     Other Topics Concern   • Not on file     Social History Narrative   • No narrative on file     Past Surgical History:   Procedure Laterality Date   • CARDIAC CATHETERIZATION     • CARDIAC CATHETERIZATION N/A 6/1/2018    Procedure: Left Heart Cath, coronaries, grafts, possible;  Surgeon: Ryan Calvillo MD;  Location:  [facility];  Service: Cardiovascular   • CARDIAC CATHETERIZATION N/A 6/1/2018    Procedure: Right Heart Cath;  Surgeon: Ryan Calvillo MD;  Location:  [facility];  Service: Cardiovascular   • CORONARY ARTERY BYPASS GRAFT     • HAND SURGERY     • LAMINECTOMY         Review of Systems   Constitution: Negative for chills, diaphoresis, fatigue, fever, weakness, malaise/fatigue, unexpected weight change, weight gain and weight loss.   Eyes: Negative for visual disturbance.   Cardiovascular: Positive for chest pain (described as \"Cramps\" across his chest from time to time). Negative for dyspnea on exertion, leg swelling, near-syncope, orthopnea, palpitations, paroxysmal nocturnal dyspnea and syncope.   Respiratory: Negative for cough, shortness of breath, sputum production and wheezing.    Hematologic/Lymphatic: Does not bruise/bleed easily.   Skin: Negative for flushing, itching and poor wound healing.   Musculoskeletal: Negative for falls, joint swelling, muscle weakness and myalgias.   Gastrointestinal: Negative for bloating, abdominal pain, nausea and vomiting.   Genitourinary: Negative for nocturia.   Neurological: Positive for dizziness (orthostatic intermittently). Negative for headaches, light-headedness, loss of balance, " paresthesias and seizures.   Psychiatric/Behavioral: Negative for altered mental status. The patient is nervous/anxious.    All other systems reviewed and are negative.      Procedures  Vitals:    07/02/18 1456   BP: 124/72   Pulse: 87   SpO2: 98%     1    07/02/18  1456   Weight: 91.9 kg (202 lb 9.6 oz)          Objective:     Physical Exam   Constitutional: He is oriented to person, place, and time. Vital signs are normal. He appears well-developed and well-nourished. He is cooperative. No distress.   HENT:   Head: Normocephalic and atraumatic.   Mouth/Throat: Oropharynx is clear and moist. No oropharyngeal exudate.   Eyes: Conjunctivae are normal. Right eye exhibits no discharge. Left eye exhibits no discharge.   Neck: Normal range of motion. Neck supple.   Cardiovascular: Normal rate, regular rhythm, normal heart sounds and intact distal pulses.  Exam reveals no gallop and no friction rub.    No murmur heard.  Pulmonary/Chest: Effort normal and breath sounds normal. No respiratory distress. He has no wheezes. He has no rhonchi. He has no rales. He exhibits no tenderness.   Abdominal: Soft. Normal appearance. He exhibits no distension. There is no tenderness.   Musculoskeletal: Normal range of motion. He exhibits no edema, tenderness or deformity.   Neurological: He is alert and oriented to person, place, and time.   Skin: Skin is warm, dry and intact. No rash noted. He is not diaphoretic. No erythema. No pallor.   Psychiatric: He has a normal mood and affect. His speech is normal and behavior is normal. His mood appears not anxious. His affect is not angry. He does not exhibit a depressed mood.   Vitals reviewed.      Lab Review:   Heart Catherization Impression, 06.01.2018:  1.  Severe native multivessel coronary artery disease.  2.  Occluded saphenous vein graft to the ramus intermedius, occluded at least since 2011.  3.  Patent left internal mammary to left anterior descending artery.  4.  Patent saphenous  vein graft to posterior descending artery, however the posterior descending artery is with severe disease, not amenable to intervention.  5.  Patent left internal mammary to obtuse marginal.  6.  Normal right heart filling pressures and normal cardiac output and index.  7.  Ischemic cardiomyopathy with left ventricular ejection fraction 29% by nuclear stress test yesterday (previously 35-40 percent by previous cardiac catheterization in 2011).      Assessment:          Diagnosis Plan   1. Ischemic cardiomyopathy  Adult Transthoracic Echo Limited W/ Cont if Necessary Per Protocol   2. Chronic systolic heart failure (CMS/HCC)  Adult Transthoracic Echo Limited W/ Cont if Necessary Per Protocol   3. Coronary artery disease of bypass graft of native heart with stable angina pectoris     4. Essential hypertension     5. Mixed hyperlipidemia            Plan:       - CMO: ischemic. EF noted to be less than noted in 2011. Severe CAD noted on recent catherization to native and bypass grafts.  On medical therapy at this time.     - Systolic Heart Failure: NYHA Class II.  Compensated without evidence of heart failure. On beta blocker and ace inhibitor at present. Feeling well. Echo to be completed for revaluation of EF. Will place order.    - CAD: s/p CABG has severe ischemic disease.  On aspirin and plavix as well as statin. Continue same RX . Consider addition of imdur if continued intermittent angina. Also could consider increase of beta blocker. Will hold off on these today as patient has said he is feeling better overall and has had intermittent orthostatic dizziness.     - HTN: stable.    - HLD: managed per PCP. On statin therapy.    RTC: will need 90 day echo complete. Follow up after echo. Reevaluate BP and symptoms to determine need for antianginal with nitrate or increase BB as well as need for ICD.

## 2018-09-10 ENCOUNTER — HOSPITAL ENCOUNTER (OUTPATIENT)
Dept: CARDIOLOGY | Facility: HOSPITAL | Age: 58
Discharge: HOME | End: 2018-09-10
Admitting: INTERNAL MEDICINE

## 2018-09-10 VITALS
SYSTOLIC BLOOD PRESSURE: 124 MMHG | HEIGHT: 72 IN | DIASTOLIC BLOOD PRESSURE: 72 MMHG | BODY MASS INDEX: 27.44 KG/M2 | WEIGHT: 202.6 LBS

## 2018-09-10 DIAGNOSIS — I50.22 CHRONIC SYSTOLIC HEART FAILURE (HCC): ICD-10-CM

## 2018-09-10 DIAGNOSIS — I25.5 ISCHEMIC CARDIOMYOPATHY: ICD-10-CM

## 2018-09-10 PROCEDURE — 93306 TTE W/DOPPLER COMPLETE: CPT

## 2018-09-10 PROCEDURE — 93306 TTE W/DOPPLER COMPLETE: CPT | Performed by: INTERNAL MEDICINE

## 2018-09-10 PROCEDURE — 25010000002 PERFLUTREN 6.52 MG/ML SUSPENSION: Performed by: INTERNAL MEDICINE

## 2018-09-10 PROCEDURE — 0399T HC MYOCARDL STRAIN IMAG QUAN ASSMT PER SESS: CPT

## 2018-09-10 PROCEDURE — 0399T ADULT TRANSTHORACIC ECHO COMPLETE W/ CONT IF NECESSARY PER PROTOCOL: CPT | Performed by: INTERNAL MEDICINE

## 2018-09-10 RX ADMIN — PERFLUTREN 8.48 MG: 6.52 INJECTION, SUSPENSION INTRAVENOUS at 10:49

## 2018-09-12 ENCOUNTER — TELEPHONE (OUTPATIENT)
Dept: CARDIOLOGY | Facility: CLINIC | Age: 58
End: 2018-09-12

## 2018-09-12 LAB
BH CV ECHO MEAS - AO MAX PG (FULL): 7.5 MMHG
BH CV ECHO MEAS - AO MAX PG: 9.4 MMHG
BH CV ECHO MEAS - AO MEAN PG (FULL): 4 MMHG
BH CV ECHO MEAS - AO MEAN PG: 5 MMHG
BH CV ECHO MEAS - AO ROOT AREA (BSA CORRECTED): 1.6
BH CV ECHO MEAS - AO ROOT AREA: 9.6 CM^2
BH CV ECHO MEAS - AO ROOT DIAM: 3.5 CM
BH CV ECHO MEAS - AO V2 MAX: 153 CM/SEC
BH CV ECHO MEAS - AO V2 MEAN: 109 CM/SEC
BH CV ECHO MEAS - AO V2 VTI: 35.3 CM
BH CV ECHO MEAS - AVA(I,A): 2.1 CM^2
BH CV ECHO MEAS - AVA(I,D): 2.1 CM^2
BH CV ECHO MEAS - AVA(V,A): 2.2 CM^2
BH CV ECHO MEAS - AVA(V,D): 2.2 CM^2
BH CV ECHO MEAS - BSA(HAYCOCK): 2.2 M^2
BH CV ECHO MEAS - BSA: 2.1 M^2
BH CV ECHO MEAS - BZI_BMI: 27.1 KILOGRAMS/M^2
BH CV ECHO MEAS - BZI_METRIC_HEIGHT: 182.9 CM
BH CV ECHO MEAS - BZI_METRIC_WEIGHT: 90.7 KG
BH CV ECHO MEAS - EDV(CUBED): 205.4 ML
BH CV ECHO MEAS - EDV(MOD-SP4): 256 ML
BH CV ECHO MEAS - EDV(TEICH): 173.2 ML
BH CV ECHO MEAS - EF(MOD-SP4): 31.3 %
BH CV ECHO MEAS - ESV(MOD-SP4): 176 ML
BH CV ECHO MEAS - IVS/LVPW: 1.1
BH CV ECHO MEAS - IVSD: 1.3 CM
BH CV ECHO MEAS - LA DIMENSION: 3.7 CM
BH CV ECHO MEAS - LA/AO: 1.1
BH CV ECHO MEAS - LAT PEAK E' VEL: 5.4 CM/SEC
BH CV ECHO MEAS - LV DIASTOLIC VOL/BSA (35-75): 120.2 ML/M^2
BH CV ECHO MEAS - LV MASS(C)D: 322.9 GRAMS
BH CV ECHO MEAS - LV MASS(C)DI: 151.5 GRAMS/M^2
BH CV ECHO MEAS - LV MAX PG: 1.9 MMHG
BH CV ECHO MEAS - LV MEAN PG: 1 MMHG
BH CV ECHO MEAS - LV SYSTOLIC VOL/BSA (12-30): 82.6 ML/M^2
BH CV ECHO MEAS - LV V1 MAX: 68.7 CM/SEC
BH CV ECHO MEAS - LV V1 MEAN: 55.4 CM/SEC
BH CV ECHO MEAS - LV V1 VTI: 15 CM
BH CV ECHO MEAS - LVIDD: 5.9 CM
BH CV ECHO MEAS - LVLD AP4: 9.9 CM
BH CV ECHO MEAS - LVLS AP4: 9.1 CM
BH CV ECHO MEAS - LVOT AREA (M): 4.9 CM^2
BH CV ECHO MEAS - LVOT AREA: 4.9 CM^2
BH CV ECHO MEAS - LVOT DIAM: 2.5 CM
BH CV ECHO MEAS - LVPWD: 1.2 CM
BH CV ECHO MEAS - MED PEAK E' VEL: 7.6 CM/SEC
BH CV ECHO MEAS - MV A MAX VEL: 95.6 CM/SEC
BH CV ECHO MEAS - MV DEC TIME: 0.2 SEC
BH CV ECHO MEAS - MV E MAX VEL: 62.1 CM/SEC
BH CV ECHO MEAS - MV E/A: 0.65
BH CV ECHO MEAS - SI(AO): 159.4 ML/M^2
BH CV ECHO MEAS - SI(LVOT): 34.6 ML/M^2
BH CV ECHO MEAS - SI(MOD-SP4): 37.6 ML/M^2
BH CV ECHO MEAS - SV(AO): 339.6 ML
BH CV ECHO MEAS - SV(LVOT): 73.6 ML
BH CV ECHO MEAS - SV(MOD-SP4): 80 ML
BH CV ECHO MEASUREMENTS AVERAGE E/E' RATIO: 9.55
LEFT ATRIUM VOLUME INDEX: 23.1 ML/M2
LEFT ATRIUM VOLUME: 49.1 CM3
MAXIMAL PREDICTED HEART RATE: 161 BPM
STRESS TARGET HR: 137 BPM

## 2018-09-12 NOTE — TELEPHONE ENCOUNTER
----- Message from DONYA Villareal sent at 9/12/2018  1:22 PM CDT -----  Please notify patient that EF was improved to 36-40%, no need for permanent ICD at this time. Continue same medicines.  He should keep office follow up to discuss blood pressure and any symptoms he might be having.

## 2018-09-12 NOTE — PROGRESS NOTES
Please notify patient that EF was improved to 36-40%, no need for permanent ICD at this time. Continue same medicines.  He should keep office follow up to discuss blood pressure and any symptoms he might be having.

## 2018-09-17 ENCOUNTER — OFFICE VISIT (OUTPATIENT)
Dept: CARDIOLOGY | Facility: CLINIC | Age: 58
End: 2018-09-17

## 2018-09-17 VITALS
RESPIRATION RATE: 18 BRPM | DIASTOLIC BLOOD PRESSURE: 75 MMHG | OXYGEN SATURATION: 98 % | HEART RATE: 89 BPM | BODY MASS INDEX: 27.5 KG/M2 | SYSTOLIC BLOOD PRESSURE: 135 MMHG | WEIGHT: 203 LBS | HEIGHT: 72 IN

## 2018-09-17 DIAGNOSIS — I50.22 CHRONIC SYSTOLIC HEART FAILURE (HCC): ICD-10-CM

## 2018-09-17 DIAGNOSIS — E78.2 MIXED HYPERLIPIDEMIA: ICD-10-CM

## 2018-09-17 DIAGNOSIS — I10 ESSENTIAL HYPERTENSION: ICD-10-CM

## 2018-09-17 DIAGNOSIS — I25.5 ISCHEMIC CARDIOMYOPATHY: Primary | ICD-10-CM

## 2018-09-17 DIAGNOSIS — I25.708 CORONARY ARTERY DISEASE OF BYPASS GRAFT OF NATIVE HEART WITH STABLE ANGINA PECTORIS (HCC): ICD-10-CM

## 2018-09-17 PROCEDURE — 99214 OFFICE O/P EST MOD 30 MIN: CPT | Performed by: NURSE PRACTITIONER

## 2018-09-17 PROCEDURE — 93000 ELECTROCARDIOGRAM COMPLETE: CPT | Performed by: NURSE PRACTITIONER

## 2018-09-17 RX ORDER — AMLODIPINE BESYLATE 5 MG/1
7.5 TABLET ORAL DAILY
COMMUNITY

## 2018-09-17 RX ORDER — METOPROLOL SUCCINATE 25 MG/1
25 TABLET, EXTENDED RELEASE ORAL DAILY
Qty: 30 TABLET | Refills: 11 | Status: SHIPPED | OUTPATIENT
Start: 2018-09-17 | End: 2018-10-01

## 2018-09-17 NOTE — PROGRESS NOTES
Subjective:     Encounter Date:09/17/2018      Patient ID: Ramesh Daniels Jr. is a 58 y.o. male.    Chief Complaint: Fatigue  Coronary Artery Disease   Presents for follow-up visit. Symptoms include dizziness. Pertinent negatives include no chest pain, chest pressure, chest tightness, leg swelling, muscle weakness, palpitations, shortness of breath or weight gain. Risk factors include hyperlipidemia. His past medical history is significant for CHF. The symptoms have been stable. Compliance with diet is good. Compliance with exercise is good. Compliance with medications is good.   Congestive Heart Failure   Presents for follow-up visit. Associated symptoms include fatigue. Pertinent negatives include no abdominal pain, chest pain, chest pressure, muscle weakness, near-syncope, palpitations or shortness of breath. The symptoms have been stable. His past medical history is significant for CAD. Compliance with total regimen is %. Compliance with diet is %. Compliance with exercise is %. Compliance with medications is %.   Hypertension   This is a chronic problem. The current episode started more than 1 year ago. The problem is controlled. Associated symptoms include malaise/fatigue. Pertinent negatives include no chest pain, headaches, orthopnea, palpitations, PND or shortness of breath. Past treatments include ACE inhibitors, beta blockers and calcium channel blockers.   Hyperlipidemia   This is a chronic problem. The current episode started more than 1 year ago. The problem is controlled. Recent lipid tests were reviewed and are normal. Pertinent negatives include no chest pain or shortness of breath. Current antihyperlipidemic treatment includes statins. The current treatment provides moderate improvement of lipids.     Patient presents today for follow up for systolic congestive heart failure, hypertension, coronary artery disease, hyperlipidemia. Patient had a repeat echo that showed an  improvement in EF up to 36-40%. Patient has severe native and bypass disease that is being treated medically. He states he has had a rare episode of chest pain but nothing like the episodes he was having before. Patient complains of fatigue. Patient is curious if he could just take beta blocker at night like he does his other medications. Patient denies issues with swelling or weight gain.     The following portions of the patient's history were reviewed and updated as appropriate: allergies, current medications, past family history, past medical history, past social history, past surgical history and problem list.   Prior to Admission medications    Medication Sig Start Date End Date Taking? Authorizing Provider   amLODIPine (NORVASC) 5 MG tablet Take 5 mg by mouth Daily.   Yes Ledy Malagon MD   aspirin 81 MG EC tablet Take 81 mg by mouth Daily.   Yes Ledy Malagon MD   clonazePAM (KlonoPIN) 0.5 MG tablet Take 0.5 mg by mouth At Night As Needed for Seizures.   Yes Ledy Malagon MD   clopidogrel (PLAVIX) 75 MG tablet Take 75 mg by mouth Daily.   Yes Ledy Malagon MD   cyclobenzaprine (FLEXERIL) 10 MG tablet Take 10 mg by mouth 3 (Three) Times a Day As Needed for Muscle Spasms.   Yes Ledy Malagon MD   rosuvastatin (CRESTOR) 20 MG tablet Take 20 mg by mouth Daily.   Yes Ledy Malagon MD   lisinopril (PRINIVIL,ZESTRIL) 40 MG tablet Take 40 mg by mouth Daily.  9/17/18 Yes Ledy Malagon MD   metoprolol tartrate (LOPRESSOR) 25 MG tablet Take 1 tablet by mouth Every 12 (Twelve) Hours. 6/1/18 9/17/18 Yes Mya Rodriges APRN     Past Medical History:   Diagnosis Date   • CAD (coronary artery disease)    • ED (erectile dysfunction)    • Hyperlipidemia    • Hypertension        Review of Systems   Constitution: Positive for fatigue and malaise/fatigue. Negative for chills, decreased appetite, fever, weight gain and weight loss.   HENT: Negative for nosebleeds.    Eyes:  Negative for visual disturbance.   Cardiovascular: Negative for chest pain, dyspnea on exertion, leg swelling, near-syncope, orthopnea, palpitations, paroxysmal nocturnal dyspnea and syncope.   Respiratory: Negative for chest tightness, cough, hemoptysis, shortness of breath and snoring.    Endocrine: Negative for cold intolerance and heat intolerance.   Hematologic/Lymphatic: Negative for bleeding problem. Does not bruise/bleed easily.   Skin: Negative for rash.   Musculoskeletal: Negative for back pain, falls and muscle weakness.   Gastrointestinal: Negative for abdominal pain, constipation, diarrhea, heartburn, melena, nausea and vomiting.   Genitourinary: Negative for hematuria.   Neurological: Positive for dizziness. Negative for headaches and light-headedness.   Psychiatric/Behavioral: Negative for altered mental status.   Allergic/Immunologic: Negative for persistent infections.         ECG 12 Lead  Date/Time: 9/17/2018 2:06 PM  Performed by: LINDA MYERS  Authorized by: LINDA MYERS   Comparison: compared with previous ECG from 5/31/2018  Similar to previous ECG  Rhythm: sinus rhythm               Objective:     Physical Exam   Constitutional: He is oriented to person, place, and time. He appears well-developed and well-nourished.   HENT:   Head: Normocephalic and atraumatic.   Eyes: Pupils are equal, round, and reactive to light.   Neck: Normal range of motion. Neck supple. No JVD present. Carotid bruit is not present.   Cardiovascular: Normal rate, regular rhythm, normal heart sounds and intact distal pulses.    Pulmonary/Chest: Effort normal and breath sounds normal.   Abdominal: Soft. Bowel sounds are normal.   Musculoskeletal: Normal range of motion.   Neurological: He is alert and oriented to person, place, and time. He has normal reflexes.   Skin: Skin is warm and dry.   Psychiatric: He has a normal mood and affect. His behavior is normal. Judgment and thought content normal.     Blood pressure  "135/75, pulse 89, resp. rate 18, height 182.9 cm (72\"), weight 92.1 kg (203 lb), SpO2 98 %.      Lab Review:       Assessment:          Diagnosis Plan   1. Ischemic cardiomyopathy     2. Chronic systolic heart failure (CMS/HCC)     3. Coronary artery disease of bypass graft of native heart with stable angina pectoris (CMS/HCC)     4. Essential hypertension     5. Mixed hyperlipidemia     6. BMI 27.0-27.9,adult            Plan:       1/2. Ischemic Cardiomyopathy and chronic systolic congestive heart failure- chronic. Class II symptoms. Will start patient on entresto- 36 hour wash out with Lisinopril- patient verbalizes understanding. Will switch lopressor to Toprol and take at bedtime. Educated patient on the importance of daily weights. Call office if 2-3 pound weight gain overnight or 5 pounds in a week. Discussed low sodium diet, less than 2g daily.   3. Coronary Artery Disease- severe native and graft disease. On statin, aspirin and plavix. BB and ACE. Angina stable.  4. Blood Pressure controlled  5. Mixed Hyperlipidemia- on statin  6. Patient's Body mass index is 27.53 kg/m². BMI is above normal parameters. Recommendations include: exercise counseling and nutrition counseling.         "

## 2018-10-01 ENCOUNTER — OFFICE VISIT (OUTPATIENT)
Dept: CARDIOLOGY | Facility: CLINIC | Age: 58
End: 2018-10-01

## 2018-10-01 VITALS
HEIGHT: 72 IN | WEIGHT: 204 LBS | HEART RATE: 80 BPM | SYSTOLIC BLOOD PRESSURE: 142 MMHG | DIASTOLIC BLOOD PRESSURE: 80 MMHG | BODY MASS INDEX: 27.63 KG/M2 | OXYGEN SATURATION: 99 %

## 2018-10-01 DIAGNOSIS — I25.708 CORONARY ARTERY DISEASE OF BYPASS GRAFT OF NATIVE HEART WITH STABLE ANGINA PECTORIS (HCC): ICD-10-CM

## 2018-10-01 DIAGNOSIS — E78.2 MIXED HYPERLIPIDEMIA: ICD-10-CM

## 2018-10-01 DIAGNOSIS — I50.22 CHRONIC SYSTOLIC CONGESTIVE HEART FAILURE, NYHA CLASS 3 (HCC): Primary | ICD-10-CM

## 2018-10-01 DIAGNOSIS — I10 ESSENTIAL HYPERTENSION: ICD-10-CM

## 2018-10-01 DIAGNOSIS — I25.5 ISCHEMIC CARDIOMYOPATHY: ICD-10-CM

## 2018-10-01 PROCEDURE — 99214 OFFICE O/P EST MOD 30 MIN: CPT | Performed by: NURSE PRACTITIONER

## 2018-10-01 RX ORDER — METOPROLOL SUCCINATE 25 MG/1
25 TABLET, EXTENDED RELEASE ORAL DAILY
Qty: 30 TABLET | Refills: 11 | Status: SHIPPED | OUTPATIENT
Start: 2018-10-01 | End: 2018-12-05 | Stop reason: SDUPTHER

## 2018-10-01 NOTE — PROGRESS NOTES
Subjective:     Encounter Date:10/01/2018      Patient ID: Ramesh Daniels Jr. is a 58 y.o. male.    Chief Complaint: Congestive Heart failure  Coronary Artery Disease   Presents for follow-up visit. Pertinent negatives include no chest pain, chest pressure, chest tightness, dizziness, leg swelling, muscle weakness, palpitations, shortness of breath or weight gain. Risk factors include hyperlipidemia. His past medical history is significant for CHF. The symptoms have been stable. Compliance with diet is good. Compliance with exercise is good. Compliance with medications is good.   Congestive Heart Failure   Presents for follow-up visit. Pertinent negatives include no abdominal pain, chest pain, chest pressure, fatigue, muscle weakness, near-syncope, palpitations or shortness of breath. The symptoms have been stable. His past medical history is significant for CAD. Compliance with total regimen is %. Compliance with diet is %. Compliance with exercise is %. Compliance with medications is %.   Hypertension   This is a chronic problem. The current episode started more than 1 year ago. The problem is controlled. Pertinent negatives include no chest pain, headaches, malaise/fatigue, orthopnea, palpitations, PND or shortness of breath. Past treatments include ACE inhibitors, beta blockers and calcium channel blockers.   Hyperlipidemia   This is a chronic problem. The current episode started more than 1 year ago. The problem is controlled. Recent lipid tests were reviewed and are normal. Pertinent negatives include no chest pain or shortness of breath. Current antihyperlipidemic treatment includes statins. The current treatment provides moderate improvement of lipids.     Patient presents today for follow up for systolic congestive heart failure, hypertension, coronary artery disease, hyperlipidemia. Patient had a repeat echo that showed an improvement in EF up to 36-40%. Patient has severe native  and bypass disease that is being treated medically. Patient at last office visit was started on entresto- he has tolerated this well and has been feeling good- states he has more energy. Patient was having some side effects to lopressor and was supposed to switch to Toprol XL at bedtime but did not start Toprol. Dizziness and Fatigue have improved.     The following portions of the patient's history were reviewed and updated as appropriate: allergies, current medications, past family history, past medical history, past social history, past surgical history and problem list.   Prior to Admission medications    Medication Sig Start Date End Date Taking? Authorizing Provider   amLODIPine (NORVASC) 5 MG tablet Take 5 mg by mouth Daily.   Yes Ledy Malagon MD   aspirin 81 MG EC tablet Take 81 mg by mouth Daily.   Yes Ledy Malagon MD   clonazePAM (KlonoPIN) 0.5 MG tablet Take 0.5 mg by mouth At Night As Needed for Seizures.   Yes Ledy Malagon MD   clopidogrel (PLAVIX) 75 MG tablet Take 75 mg by mouth Daily.   Yes Ledy Malagon MD   cyclobenzaprine (FLEXERIL) 10 MG tablet Take 10 mg by mouth 3 (Three) Times a Day As Needed for Muscle Spasms.   Yes Ledy Malagon MD   rosuvastatin (CRESTOR) 20 MG tablet Take 20 mg by mouth Daily.   Yes ProviderLedy MD   lisinopril (PRINIVIL,ZESTRIL) 40 MG tablet Take 40 mg by mouth Daily.  9/17/18 Yes Ledy Malagon MD   metoprolol tartrate (LOPRESSOR) 25 MG tablet Take 1 tablet by mouth Every 12 (Twelve) Hours. 6/1/18 9/17/18 Yes Mya Rodriges APRN     Past Medical History:   Diagnosis Date   • CAD (coronary artery disease)    • ED (erectile dysfunction)    • Hyperlipidemia    • Hypertension        Review of Systems   Constitution: Negative for chills, decreased appetite, fatigue, fever, malaise/fatigue, weight gain and weight loss.   HENT: Negative for nosebleeds.    Eyes: Negative for visual disturbance.   Cardiovascular:  "Negative for chest pain, dyspnea on exertion, leg swelling, near-syncope, orthopnea, palpitations, paroxysmal nocturnal dyspnea and syncope.   Respiratory: Negative for chest tightness, cough, hemoptysis, shortness of breath and snoring.    Endocrine: Negative for cold intolerance and heat intolerance.   Hematologic/Lymphatic: Negative for bleeding problem. Does not bruise/bleed easily.   Skin: Negative for rash.   Musculoskeletal: Negative for back pain, falls and muscle weakness.   Gastrointestinal: Negative for abdominal pain, constipation, diarrhea, heartburn, melena, nausea and vomiting.   Genitourinary: Negative for hematuria.   Neurological: Negative for dizziness, headaches and light-headedness.   Psychiatric/Behavioral: Negative for altered mental status.   Allergic/Immunologic: Negative for persistent infections.       Procedures       Objective:     Physical Exam   Constitutional: He is oriented to person, place, and time. He appears well-developed and well-nourished.   HENT:   Head: Normocephalic and atraumatic.   Eyes: Pupils are equal, round, and reactive to light.   Neck: Normal range of motion. Neck supple. No JVD present. Carotid bruit is not present.   Cardiovascular: Normal rate, regular rhythm, normal heart sounds and intact distal pulses.    Pulmonary/Chest: Effort normal and breath sounds normal.   Abdominal: Soft. Bowel sounds are normal.   Musculoskeletal: Normal range of motion.   Neurological: He is alert and oriented to person, place, and time. He has normal reflexes.   Skin: Skin is warm and dry.   Psychiatric: He has a normal mood and affect. His behavior is normal. Judgment and thought content normal.     Blood pressure 142/80, pulse 80, height 182.9 cm (72\"), weight 92.5 kg (204 lb), SpO2 99 %.      Lab Review:       Assessment:          Diagnosis Plan   1. Chronic systolic congestive heart failure, NYHA class 3 (CMS/[facility])  Basic Metabolic Panel   2. Ischemic cardiomyopathy     3. " Coronary artery disease of bypass graft of native heart with stable angina pectoris (CMS/[facility])     4. Essential hypertension     5. Mixed hyperlipidemia     6. BMI 27.0-27.9,adult            Plan:       1/2. Ischemic Cardiomyopathy and chronic systolic congestive heart failure- chronic. Class II/III symptoms. Will increase entresto. Check BMP today. Start Toprol at bedtime. Educated patient on the importance of daily weights. Call office if 2-3 pound weight gain overnight or 5 pounds in a week. Discussed low sodium diet, less than 2g daily.   3. Coronary Artery Disease- severe native and graft disease. On statin, aspirin and plavix. No angina. On entresto. Will start back toporol  4. Blood Pressure controlled  5. Mixed Hyperlipidemia- on statin  6. Patient's Body mass index is 27.67 kg/m². BMI is above normal parameters. Recommendations include: exercise counseling and nutrition counseling.

## 2018-10-02 ENCOUNTER — APPOINTMENT (OUTPATIENT)
Dept: LAB | Facility: HOSPITAL | Age: 58
End: 2018-10-02

## 2018-10-02 LAB
ANION GAP SERPL CALCULATED.3IONS-SCNC: 9 MMOL/L (ref 4–13)
BUN BLD-MCNC: 11 MG/DL (ref 5–21)
BUN/CREAT SERPL: 14.7 (ref 7–25)
CALCIUM SPEC-SCNC: 9.4 MG/DL (ref 8.4–10.4)
CHLORIDE SERPL-SCNC: 104 MMOL/L (ref 98–110)
CO2 SERPL-SCNC: 27 MMOL/L (ref 24–31)
CREAT BLD-MCNC: 0.75 MG/DL (ref 0.5–1.4)
GFR SERPL CREATININE-BSD FRML MDRD: 107 ML/MIN/1.73
GLUCOSE BLD-MCNC: 103 MG/DL (ref 70–100)
POTASSIUM BLD-SCNC: 4.2 MMOL/L (ref 3.5–5.3)
SODIUM BLD-SCNC: 140 MMOL/L (ref 135–145)

## 2018-10-02 PROCEDURE — 36415 COLL VENOUS BLD VENIPUNCTURE: CPT

## 2018-10-02 PROCEDURE — 80048 BASIC METABOLIC PNL TOTAL CA: CPT | Performed by: NURSE PRACTITIONER

## 2018-10-15 ENCOUNTER — HOSPITAL ENCOUNTER (OUTPATIENT)
Dept: GENERAL RADIOLOGY | Facility: HOSPITAL | Age: 58
Discharge: HOME | End: 2018-10-15
Admitting: NURSE PRACTITIONER

## 2018-10-15 ENCOUNTER — TRANSCRIBE ORDERS (OUTPATIENT)
Dept: GENERAL RADIOLOGY | Facility: HOSPITAL | Age: 58
End: 2018-10-15

## 2018-10-15 DIAGNOSIS — M25.552 LEFT HIP PAIN: ICD-10-CM

## 2018-10-15 DIAGNOSIS — M25.552 LEFT HIP PAIN: Primary | ICD-10-CM

## 2018-10-15 PROCEDURE — 73502 X-RAY EXAM HIP UNI 2-3 VIEWS: CPT

## 2018-10-15 PROCEDURE — 73552 X-RAY EXAM OF FEMUR 2/>: CPT

## 2018-10-16 ENCOUNTER — HOSPITAL ENCOUNTER (OUTPATIENT)
Dept: GENERAL RADIOLOGY | Facility: HOSPITAL | Age: 58
Discharge: HOME | End: 2018-10-16
Admitting: NURSE PRACTITIONER

## 2018-10-16 ENCOUNTER — TRANSCRIBE ORDERS (OUTPATIENT)
Dept: GENERAL RADIOLOGY | Facility: HOSPITAL | Age: 58
End: 2018-10-16

## 2018-10-16 DIAGNOSIS — M25.552 PAIN OF LEFT HIP JOINT: ICD-10-CM

## 2018-10-16 DIAGNOSIS — M25.552 PAIN OF LEFT HIP JOINT: Primary | ICD-10-CM

## 2018-10-16 DIAGNOSIS — M79.605 LEG PAIN, LEFT: ICD-10-CM

## 2018-10-16 PROCEDURE — 73552 X-RAY EXAM OF FEMUR 2/>: CPT

## 2018-10-17 ENCOUNTER — TRANSCRIBE ORDERS (OUTPATIENT)
Dept: LAB | Facility: HOSPITAL | Age: 58
End: 2018-10-17

## 2018-10-17 ENCOUNTER — HOSPITAL ENCOUNTER (OUTPATIENT)
Dept: CT IMAGING | Facility: HOSPITAL | Age: 58
Discharge: HOME | End: 2018-10-17
Admitting: NURSE PRACTITIONER

## 2018-10-17 DIAGNOSIS — M79.605 LEFT LEG PAIN: Primary | ICD-10-CM

## 2018-10-17 DIAGNOSIS — M79.605 LEFT LEG PAIN: ICD-10-CM

## 2018-10-17 PROCEDURE — 73700 CT LOWER EXTREMITY W/O DYE: CPT

## 2018-10-24 ENCOUNTER — OFFICE VISIT (OUTPATIENT)
Dept: CARDIOLOGY | Facility: CLINIC | Age: 58
End: 2018-10-24

## 2018-10-24 VITALS
BODY MASS INDEX: 27.5 KG/M2 | HEART RATE: 67 BPM | WEIGHT: 203 LBS | HEIGHT: 72 IN | OXYGEN SATURATION: 98 % | DIASTOLIC BLOOD PRESSURE: 68 MMHG | SYSTOLIC BLOOD PRESSURE: 134 MMHG

## 2018-10-24 DIAGNOSIS — I25.5 ISCHEMIC CARDIOMYOPATHY: ICD-10-CM

## 2018-10-24 DIAGNOSIS — I10 ESSENTIAL HYPERTENSION: ICD-10-CM

## 2018-10-24 DIAGNOSIS — I25.708 CORONARY ARTERY DISEASE OF BYPASS GRAFT OF NATIVE HEART WITH STABLE ANGINA PECTORIS (HCC): ICD-10-CM

## 2018-10-24 DIAGNOSIS — E78.2 MIXED HYPERLIPIDEMIA: ICD-10-CM

## 2018-10-24 DIAGNOSIS — I50.22 CHRONIC SYSTOLIC CONGESTIVE HEART FAILURE (HCC): Primary | ICD-10-CM

## 2018-10-24 PROCEDURE — 99214 OFFICE O/P EST MOD 30 MIN: CPT | Performed by: NURSE PRACTITIONER

## 2018-10-24 NOTE — PROGRESS NOTES
Subjective:     Encounter Date:10/24/2018      Patient ID: Ramesh Daniels Jr. is a 58 y.o. male.    Chief Complaint: Congestive Heart failure  Congestive Heart Failure   Presents for follow-up visit. Pertinent negatives include no abdominal pain, chest pain, chest pressure, fatigue, muscle weakness, near-syncope, palpitations or shortness of breath. The symptoms have been stable. His past medical history is significant for CAD. Compliance with total regimen is %. Compliance with diet is %. Compliance with exercise is %. Compliance with medications is %.   Coronary Artery Disease   Presents for follow-up visit. Pertinent negatives include no chest pain, chest pressure, chest tightness, dizziness, leg swelling, muscle weakness, palpitations, shortness of breath or weight gain. Risk factors include hyperlipidemia. His past medical history is significant for CHF. The symptoms have been stable. Compliance with diet is good. Compliance with exercise is good. Compliance with medications is good.   Hypertension   This is a chronic problem. The current episode started more than 1 year ago. The problem is controlled. Pertinent negatives include no chest pain, headaches, malaise/fatigue, orthopnea, palpitations, PND or shortness of breath. Past treatments include ACE inhibitors, beta blockers, calcium channel blockers and diuretics. The current treatment provides significant improvement. There are no compliance problems.    Hyperlipidemia   This is a chronic problem. The current episode started more than 1 year ago. The problem is controlled. Recent lipid tests were reviewed and are normal. Pertinent negatives include no chest pain or shortness of breath. Current antihyperlipidemic treatment includes statins. The current treatment provides moderate improvement of lipids. There are no compliance problems.      Patient presents today for follow up for systolic congestive heart failure, hypertension,  coronary artery disease, hyperlipidemia. Patient had a repeat echo that showed an improvement in EF up to 36-40%. Patient has severe native and bypass disease that is being treated medically. Patient at last office visit had his entresto increased to 49/51 dose but unfortunately was confused and reduced down to taking once a day. His blood work was WNL. Patient also started Toprol XL back at night and has tolerated this well without side effects. Patient has been doing well. More energy and back to playing golf.     The following portions of the patient's history were reviewed and updated as appropriate: allergies, current medications, past family history, past medical history, past social history, past surgical history and problem list.   Prior to Admission medications    Medication Sig Start Date End Date Taking? Authorizing Provider   amLODIPine (NORVASC) 5 MG tablet Take 5 mg by mouth Daily.   Yes Ledy Malagon MD   aspirin 81 MG EC tablet Take 81 mg by mouth Daily.   Yes Ledy Malagon MD   clonazePAM (KlonoPIN) 0.5 MG tablet Take 0.5 mg by mouth At Night As Needed for Seizures.   Yes Ledy Malagon MD   clopidogrel (PLAVIX) 75 MG tablet Take 75 mg by mouth Daily.   Yes Ledy Malagon MD   cyclobenzaprine (FLEXERIL) 10 MG tablet Take 10 mg by mouth 3 (Three) Times a Day As Needed for Muscle Spasms.   Yes Ledy Malagon MD   metoprolol succinate XL (TOPROL-XL) 25 MG 24 hr tablet Take 1 tablet by mouth Daily. 10/1/18  Yes Hamilton Napoles APRN   rosuvastatin (CRESTOR) 20 MG tablet Take 20 mg by mouth Daily.   Yes Ledy Malagon MD   sacubitril-valsartan (ENTRESTO) 49-51 MG tablet Take 1 tablet by mouth Every 12 (Twelve) Hours. 10/1/18  Yes Hamilton Napoles APRN       Past Medical History:   Diagnosis Date   • CAD (coronary artery disease)    • ED (erectile dysfunction)    • Hyperlipidemia    • Hypertension        Review of Systems   Constitution: Negative for chills,  "decreased appetite, fatigue, fever, malaise/fatigue, weight gain and weight loss.   HENT: Negative for nosebleeds.    Eyes: Negative for visual disturbance.   Cardiovascular: Negative for chest pain, dyspnea on exertion, leg swelling, near-syncope, orthopnea, palpitations, paroxysmal nocturnal dyspnea and syncope.   Respiratory: Negative for chest tightness, cough, hemoptysis, shortness of breath and snoring.    Endocrine: Negative for cold intolerance and heat intolerance.   Hematologic/Lymphatic: Negative for bleeding problem. Does not bruise/bleed easily.   Skin: Negative for rash.   Musculoskeletal: Negative for back pain, falls and muscle weakness.   Gastrointestinal: Negative for abdominal pain, constipation, diarrhea, heartburn, melena, nausea and vomiting.   Genitourinary: Negative for hematuria.   Neurological: Negative for dizziness, headaches and light-headedness.   Psychiatric/Behavioral: Negative for altered mental status.   Allergic/Immunologic: Negative for persistent infections.       Procedures       Objective:     Physical Exam   Constitutional: He is oriented to person, place, and time. He appears well-developed and well-nourished.   HENT:   Head: Normocephalic and atraumatic.   Eyes: Pupils are equal, round, and reactive to light.   Neck: Normal range of motion. Neck supple. No JVD present. Carotid bruit is not present.   Cardiovascular: Normal rate, regular rhythm, normal heart sounds and intact distal pulses.    Pulmonary/Chest: Effort normal and breath sounds normal.   Abdominal: Soft. Bowel sounds are normal.   Musculoskeletal: Normal range of motion.   Neurological: He is alert and oriented to person, place, and time. He has normal reflexes.   Skin: Skin is warm and dry.   Psychiatric: He has a normal mood and affect. His behavior is normal. Judgment and thought content normal.     Blood pressure 134/68, pulse 67, height 182.9 cm (72\"), weight 92.1 kg (203 lb), SpO2 98 %.      Lab Review:    "    Assessment:          Diagnosis Plan   1. Chronic systolic congestive heart failure (CMS/[facility])  Basic Metabolic Panel   2. Ischemic cardiomyopathy     3. Coronary artery disease of bypass graft of native heart with stable angina pectoris (CMS/[facility])     4. Essential hypertension     5. Mixed hyperlipidemia     6. BMI 27.0-27.9,adult            Plan:       1/2. Ischemic Cardiomyopathy and chronic systolic congestive heart failure- chronic. Class II/III symptoms. Patient will start taking entresto twice a day at current dose. Price is reasonable for patient. Check BMP in 6 weeks. tolerating Toprol at bedtime. Educated patient on the importance of daily weights. Call office if 2-3 pound weight gain overnight or 5 pounds in a week. Discussed low sodium diet, less than 2g daily.   3. Coronary Artery Disease- severe native and graft disease. On statin, aspirin and plavix. No angina. On entresto. Tolerating toprol. Active without angina.  4. Blood Pressure -borderline high today but increase entresto to BID.   5. Mixed Hyperlipidemia- on statin  6. Patient's Body mass index is 27.53 kg/m². BMI is above normal parameters. Recommendations include: exercise counseling and nutrition counseling.    Follow up in 6 weeks or sooner if needed.

## 2018-11-27 ENCOUNTER — TELEPHONE (OUTPATIENT)
Dept: CARDIOLOGY | Facility: CLINIC | Age: 58
End: 2018-11-27

## 2018-11-27 NOTE — TELEPHONE ENCOUNTER
[place] @  Dr. Henderson [phone]    Dr. Henderson wants to know if this pt will be qualified to get cardiac rehab?  Please advise

## 2018-11-28 DIAGNOSIS — I50.22 CHRONIC SYSTOLIC HEART FAILURE (HCC): Primary | ICD-10-CM

## 2018-11-28 NOTE — TELEPHONE ENCOUNTER
I think that cardiac rehab will cover a diagnosis of heart failure.  If patient would like, we can place order

## 2018-11-29 ENCOUNTER — TELEPHONE (OUTPATIENT)
Dept: CARDIAC REHAB | Facility: HOSPITAL | Age: 58
End: 2018-11-29

## 2018-11-29 NOTE — TELEPHONE ENCOUNTER
Dr Calvillo,  You referred Mr Jeremiah to Cardiac Rehab.  He does not qualify for phase 2 cardiac rehab.  Medicare stipulates that the EF must be 35% or less.

## 2018-12-05 ENCOUNTER — OFFICE VISIT (OUTPATIENT)
Dept: CARDIOLOGY | Facility: CLINIC | Age: 58
End: 2018-12-05

## 2018-12-05 VITALS
OXYGEN SATURATION: 98 % | DIASTOLIC BLOOD PRESSURE: 72 MMHG | BODY MASS INDEX: 27.77 KG/M2 | HEIGHT: 72 IN | HEART RATE: 81 BPM | RESPIRATION RATE: 18 BRPM | SYSTOLIC BLOOD PRESSURE: 135 MMHG | WEIGHT: 205 LBS

## 2018-12-05 DIAGNOSIS — I25.810 CORONARY ARTERY DISEASE INVOLVING CORONARY BYPASS GRAFT OF NATIVE HEART WITHOUT ANGINA PECTORIS: ICD-10-CM

## 2018-12-05 DIAGNOSIS — I10 ESSENTIAL HYPERTENSION: ICD-10-CM

## 2018-12-05 DIAGNOSIS — E78.2 MIXED HYPERLIPIDEMIA: ICD-10-CM

## 2018-12-05 DIAGNOSIS — I50.22 CHRONIC SYSTOLIC HEART FAILURE (HCC): Primary | ICD-10-CM

## 2018-12-05 PROCEDURE — 93000 ELECTROCARDIOGRAM COMPLETE: CPT | Performed by: PHYSICIAN ASSISTANT

## 2018-12-05 PROCEDURE — 99214 OFFICE O/P EST MOD 30 MIN: CPT | Performed by: PHYSICIAN ASSISTANT

## 2018-12-05 RX ORDER — METOPROLOL SUCCINATE 25 MG/1
25 TABLET, EXTENDED RELEASE ORAL DAILY
Qty: 90 TABLET | Refills: 3 | Status: SHIPPED | OUTPATIENT
Start: 2018-12-05

## 2018-12-05 RX ORDER — FUROSEMIDE 20 MG/1
20 TABLET ORAL DAILY PRN
Qty: 90 TABLET | Refills: 3 | Status: SHIPPED | OUTPATIENT
Start: 2018-12-05

## 2018-12-05 NOTE — PROGRESS NOTES
"    Subjective:     Encounter Date:12/05/2018      Patient ID: Ramesh Daniels Jr. is a 58 y.o. male who presents for 6 week follow-up. He reports that his breathing has improved, but he still has exertional dyspnea with \"overt exertion.\" He denies any chest pain with activity or otherwise. He denies any leg swelling. He states that he has been compliant with medications, daily weights and diet. His blood pressure has been very well controlled. He states that he was having issues with dizziness, but since starting Entresto this has not been an issue for him any longer.     Chief Complaint: routine follow-up    Congestive Heart Failure   Presents for follow-up visit. Pertinent negatives include no chest pain, claudication, fatigue, near-syncope, palpitations or shortness of breath. The symptoms have been stable. His past medical history is significant for CAD. Compliance with diet is %. Compliance with exercise is %. Compliance with medications is %.   Coronary Artery Disease   Presents for follow-up visit. Pertinent negatives include no chest pain, dizziness, leg swelling, palpitations, shortness of breath or weight gain. His past medical history is significant for CHF. The symptoms have been stable. Compliance with diet is good. Compliance with exercise is good. Compliance with medications is good.   Hypertension   This is a chronic problem. The current episode started more than 1 year ago. The problem is unchanged. The problem is controlled. Pertinent negatives include no chest pain, malaise/fatigue, orthopnea, palpitations, PND or shortness of breath. There are no associated agents to hypertension. Risk factors for coronary artery disease include male gender and dyslipidemia. Past treatments include beta blockers, calcium channel blockers, angiotensin blockers and diuretics. Current antihypertension treatment includes diuretics, calcium channel blockers, beta blockers and angiotensin blockers. The " current treatment provides significant improvement. There are no compliance problems.  Hypertensive end-organ damage includes CAD/MI. There is no history of chronic renal disease.       The following portions of the patient's history were reviewed and updated as appropriate: allergies, current medications, past family history, past medical history, past social history, past surgical history and problem list.    Review of Systems   Constitution: Negative for fatigue, malaise/fatigue and weight gain.   Cardiovascular: Positive for dyspnea on exertion. Negative for chest pain, claudication, irregular heartbeat, leg swelling, near-syncope, orthopnea, palpitations, paroxysmal nocturnal dyspnea and syncope.   Respiratory: Negative for hemoptysis and shortness of breath.    Hematologic/Lymphatic: Negative for bleeding problem.   Skin: Negative for poor wound healing.   Musculoskeletal: Negative for myalgias.   Gastrointestinal: Negative for melena, nausea and vomiting.   Genitourinary: Negative for hematuria.   Neurological: Negative for dizziness, focal weakness and light-headedness.   Psychiatric/Behavioral: Negative for memory loss.   All other systems reviewed and are negative.        ECG 12 Lead  Date/Time: 12/5/2018 10:36 AM  Performed by: Afsaneh Aguila PA-C  Authorized by: Afsaneh Aguila PA-C   Comparison: compared with previous ECG from 9/17/2018  Similar to previous ECG  Rhythm: sinus rhythm  Rate: normal  QRS axis: normal  Q waves: V2 and V3  Clinical impression: abnormal ECG               Objective:     Physical Exam   Constitutional: He is oriented to person, place, and time. He appears well-developed and well-nourished.   HENT:   Head: Normocephalic and atraumatic.   Eyes: Conjunctivae and EOM are normal. Pupils are equal, round, and reactive to light.   Neck: Normal range of motion. Neck supple. No JVD present.   Cardiovascular: Normal rate, regular rhythm, S1 normal, S2 normal, normal heart sounds, intact  "distal pulses and normal pulses.   No murmur heard.  Pulmonary/Chest: Effort normal and breath sounds normal. No respiratory distress.   Abdominal: Soft. Bowel sounds are normal. He exhibits no distension.   Musculoskeletal: He exhibits no edema or tenderness.   Neurological: He is alert and oriented to person, place, and time.   Skin: Skin is warm and dry.   Psychiatric: He has a normal mood and affect. Judgment normal.   Vitals reviewed.      /72 (BP Location: Right arm, Patient Position: Sitting, Cuff Size: Adult)   Pulse 81   Resp 18   Ht 182.9 cm (72\")   Wt 93 kg (205 lb)   SpO2 98%   BMI 27.80 kg/m²     Current Outpatient Medications:   •  amLODIPine (NORVASC) 5 MG tablet, Take 5 mg by mouth Daily., Disp: , Rfl:   •  aspirin 81 MG EC tablet, Take 81 mg by mouth Daily., Disp: , Rfl:   •  clonazePAM (KlonoPIN) 0.5 MG tablet, Take 0.5 mg by mouth At Night As Needed for Seizures., Disp: , Rfl:   •  clopidogrel (PLAVIX) 75 MG tablet, Take 75 mg by mouth Daily., Disp: , Rfl:   •  cyclobenzaprine (FLEXERIL) 10 MG tablet, Take 10 mg by mouth 3 (Three) Times a Day As Needed for Muscle Spasms., Disp: , Rfl:   •  metoprolol succinate XL (TOPROL-XL) 25 MG 24 hr tablet, Take 1 tablet by mouth Daily., Disp: 90 tablet, Rfl: 3  •  rosuvastatin (CRESTOR) 20 MG tablet, Take 20 mg by mouth Daily., Disp: , Rfl:   •  sacubitril-valsartan (ENTRESTO) 49-51 MG tablet, Take 1 tablet by mouth Every 12 (Twelve) Hours., Disp: 180 tablet, Rfl: 3  •  furosemide (LASIX) 20 MG tablet, Take 1 tablet by mouth Daily As Needed (shortness of breath, leg swelling, weight gain)., Disp: 90 tablet, Rfl: 3  Past Medical History:   Diagnosis Date   • CAD (coronary artery disease)    • ED (erectile dysfunction)    • Hyperlipidemia    • Hypertension      Past Surgical History:   Procedure Laterality Date   • CARDIAC CATHETERIZATION     • CORONARY ARTERY BYPASS GRAFT     • HAND SURGERY     • LAMINECTOMY       No Known Allergies  Social History "     Socioeconomic History   • Marital status:      Spouse name: Not on file   • Number of children: Not on file   • Years of education: Not on file   • Highest education level: Not on file   Social Needs   • Financial resource strain: Not on file   • Food insecurity - worry: Not on file   • Food insecurity - inability: Not on file   • Transportation needs - medical: Not on file   • Transportation needs - non-medical: Not on file   Occupational History   • Not on file   Tobacco Use   • Smoking status: Never Smoker   • Smokeless tobacco: Never Used   Substance and Sexual Activity   • Alcohol use: Yes     Comment: occ   • Drug use: No   • Sexual activity: Defer   Other Topics Concern   • Not on file   Social History Narrative   • Not on file     Family History   Problem Relation Age of Onset   • Coronary artery disease Father    • Coronary artery disease Brother    • Heart attack Brother            Assessment:          Diagnosis Plan   1. Chronic systolic heart failure (CMS/HCC)      Compensated.    2. Coronary artery disease of bypass graft of native heart with stable angina pectoris (CMS/HCC)     3. Essential hypertension      Reasonable control.    4. Mixed hyperlipidemia      On statin  Followed by PCP.           Plan:       1. Check recent lipid panel from PCP- adjust statin accordingly  2. Continue present therapy. Patient educated at length regarding low sodium diet, daily weights, and daily blood pressure monitoring. Patient instructed to bring daily weights and blood pressures to follow up office visit. Patient instructed to call with a 2lb weight gain overnight, 5lb weight gain in 1 week, increasing dyspnea or edema.  3. Follow-up in 3 months, unless needed sooner  4. Verbalized understanding of instructions.

## 2020-01-09 ENCOUNTER — TRANSCRIBE ORDERS (OUTPATIENT)
Dept: ADMINISTRATIVE | Facility: HOSPITAL | Age: 60
End: 2020-01-09

## 2020-01-09 DIAGNOSIS — I25.5 ISCHEMIC CARDIOMYOPATHY: Primary | ICD-10-CM

## 2020-01-16 ENCOUNTER — HOSPITAL ENCOUNTER (OUTPATIENT)
Dept: CARDIOLOGY | Facility: HOSPITAL | Age: 60
Discharge: HOME | End: 2020-01-16
Admitting: PHYSICIAN ASSISTANT

## 2020-01-16 VITALS
WEIGHT: 205 LBS | BODY MASS INDEX: 27.77 KG/M2 | DIASTOLIC BLOOD PRESSURE: 70 MMHG | SYSTOLIC BLOOD PRESSURE: 130 MMHG | HEIGHT: 72 IN

## 2020-01-16 DIAGNOSIS — I25.5 ISCHEMIC CARDIOMYOPATHY: ICD-10-CM

## 2020-01-16 PROCEDURE — 93306 TTE W/DOPPLER COMPLETE: CPT | Performed by: INTERNAL MEDICINE

## 2020-01-16 PROCEDURE — 93306 TTE W/DOPPLER COMPLETE: CPT

## 2020-01-16 PROCEDURE — 25010000002 PERFLUTREN 6.52 MG/ML SUSPENSION: Performed by: INTERNAL MEDICINE

## 2020-01-16 RX ADMIN — PERFLUTREN 8.48 MG: 6.52 INJECTION, SUSPENSION INTRAVENOUS at 13:35

## 2020-01-21 LAB
BH CV ECHO MEAS - AO MAX PG (FULL): 5.6 MMHG
BH CV ECHO MEAS - AO MAX PG: 7.2 MMHG
BH CV ECHO MEAS - AO MEAN PG (FULL): 2 MMHG
BH CV ECHO MEAS - AO MEAN PG: 3 MMHG
BH CV ECHO MEAS - AO ROOT AREA (BSA CORRECTED): 1.8
BH CV ECHO MEAS - AO ROOT AREA: 11.3 CM^2
BH CV ECHO MEAS - AO ROOT DIAM: 3.8 CM
BH CV ECHO MEAS - AO V2 MAX: 134 CM/SEC
BH CV ECHO MEAS - AO V2 MEAN: 82.3 CM/SEC
BH CV ECHO MEAS - AO V2 VTI: 24.9 CM
BH CV ECHO MEAS - AVA(I,A): 1.6 CM^2
BH CV ECHO MEAS - AVA(I,D): 1.6 CM^2
BH CV ECHO MEAS - AVA(V,A): 1.5 CM^2
BH CV ECHO MEAS - AVA(V,D): 1.5 CM^2
BH CV ECHO MEAS - BSA(HAYCOCK): 2.2 M^2
BH CV ECHO MEAS - BSA: 2.2 M^2
BH CV ECHO MEAS - BZI_BMI: 27.8 KILOGRAMS/M^2
BH CV ECHO MEAS - BZI_METRIC_HEIGHT: 182.9 CM
BH CV ECHO MEAS - BZI_METRIC_WEIGHT: 93 KG
BH CV ECHO MEAS - EDV(CUBED): 259.7 ML
BH CV ECHO MEAS - EDV(MOD-SP4): 202 ML
BH CV ECHO MEAS - EDV(TEICH): 207 ML
BH CV ECHO MEAS - EF(CUBED): 42 %
BH CV ECHO MEAS - EF(MOD-BP): 35 %
BH CV ECHO MEAS - EF(MOD-SP4): 35.1 %
BH CV ECHO MEAS - EF(TEICH): 34.1 %
BH CV ECHO MEAS - ESV(CUBED): 150.6 ML
BH CV ECHO MEAS - ESV(MOD-SP4): 131 ML
BH CV ECHO MEAS - ESV(TEICH): 136.5 ML
BH CV ECHO MEAS - FS: 16.6 %
BH CV ECHO MEAS - IVS/LVPW: 0.85
BH CV ECHO MEAS - IVSD: 0.93 CM
BH CV ECHO MEAS - LA DIMENSION: 4.1 CM
BH CV ECHO MEAS - LA/AO: 1.1
BH CV ECHO MEAS - LAT PEAK E' VEL: 7.8 CM/SEC
BH CV ECHO MEAS - LV DIASTOLIC VOL/BSA (35-75): 93.8 ML/M^2
BH CV ECHO MEAS - LV MASS(C)D: 279.6 GRAMS
BH CV ECHO MEAS - LV MASS(C)DI: 129.9 GRAMS/M^2
BH CV ECHO MEAS - LV MAX PG: 1.6 MMHG
BH CV ECHO MEAS - LV MEAN PG: 1 MMHG
BH CV ECHO MEAS - LV SYSTOLIC VOL/BSA (12-30): 60.8 ML/M^2
BH CV ECHO MEAS - LV V1 MAX: 63.5 CM/SEC
BH CV ECHO MEAS - LV V1 MEAN: 42.2 CM/SEC
BH CV ECHO MEAS - LV V1 VTI: 12.8 CM
BH CV ECHO MEAS - LVIDD: 6.4 CM
BH CV ECHO MEAS - LVIDS: 5.3 CM
BH CV ECHO MEAS - LVLD AP4: 10 CM
BH CV ECHO MEAS - LVLS AP4: 8.8 CM
BH CV ECHO MEAS - LVOT AREA (M): 3.1 CM^2
BH CV ECHO MEAS - LVOT AREA: 3.1 CM^2
BH CV ECHO MEAS - LVOT DIAM: 2 CM
BH CV ECHO MEAS - LVPWD: 1.1 CM
BH CV ECHO MEAS - MED PEAK E' VEL: 5.8 CM/SEC
BH CV ECHO MEAS - MV A MAX VEL: 60.1 CM/SEC
BH CV ECHO MEAS - MV DEC SLOPE: 218 CM/SEC^2
BH CV ECHO MEAS - MV DEC TIME: 0.19 SEC
BH CV ECHO MEAS - MV E MAX VEL: 49 CM/SEC
BH CV ECHO MEAS - MV E/A: 0.82
BH CV ECHO MEAS - MV P1/2T MAX VEL: 87.3 CM/SEC
BH CV ECHO MEAS - MV P1/2T: 117.3 MSEC
BH CV ECHO MEAS - MVA P1/2T LCG: 2.5 CM^2
BH CV ECHO MEAS - MVA(P1/2T): 1.9 CM^2
BH CV ECHO MEAS - PA MAX PG: 4.2 MMHG
BH CV ECHO MEAS - PA V2 MAX: 102 CM/SEC
BH CV ECHO MEAS - SI(AO): 131.2 ML/M^2
BH CV ECHO MEAS - SI(CUBED): 50.7 ML/M^2
BH CV ECHO MEAS - SI(LVOT): 18.7 ML/M^2
BH CV ECHO MEAS - SI(MOD-SP4): 33 ML/M^2
BH CV ECHO MEAS - SI(TEICH): 32.8 ML/M^2
BH CV ECHO MEAS - SV(AO): 282.4 ML
BH CV ECHO MEAS - SV(CUBED): 109.1 ML
BH CV ECHO MEAS - SV(LVOT): 40.2 ML
BH CV ECHO MEAS - SV(MOD-SP4): 71 ML
BH CV ECHO MEAS - SV(TEICH): 70.5 ML
BH CV ECHO MEASUREMENTS AVERAGE E/E' RATIO: 7.21
LEFT ATRIUM VOLUME INDEX: 27.6 ML/M2
MAXIMAL PREDICTED HEART RATE: 160 BPM
STRESS TARGET HR: 136 BPM

## 2021-03-09 ENCOUNTER — IMMUNIZATION (OUTPATIENT)
Dept: VACCINE CLINIC | Facility: HOSPITAL | Age: 61
End: 2021-03-09

## 2021-03-09 PROCEDURE — 0011A: CPT | Performed by: OBSTETRICS & GYNECOLOGY

## 2021-03-09 PROCEDURE — 91301 HC SARSCO02 VAC 100MCG/0.5ML IM: CPT | Performed by: OBSTETRICS & GYNECOLOGY

## 2021-04-06 ENCOUNTER — IMMUNIZATION (OUTPATIENT)
Dept: VACCINE CLINIC | Facility: HOSPITAL | Age: 61
End: 2021-04-06

## 2021-04-06 PROCEDURE — 91301 HC SARSCO02 VAC 100MCG/0.5ML IM: CPT | Performed by: OBSTETRICS & GYNECOLOGY

## 2021-04-06 PROCEDURE — 0012A: CPT | Performed by: OBSTETRICS & GYNECOLOGY

## 2021-04-21 ENCOUNTER — OFFICE VISIT (OUTPATIENT)
Dept: CARDIOLOGY | Facility: CLINIC | Age: 61
End: 2021-04-21

## 2021-04-21 VITALS
DIASTOLIC BLOOD PRESSURE: 70 MMHG | BODY MASS INDEX: 25.33 KG/M2 | OXYGEN SATURATION: 99 % | WEIGHT: 187 LBS | SYSTOLIC BLOOD PRESSURE: 118 MMHG | HEIGHT: 72 IN | HEART RATE: 77 BPM

## 2021-04-21 DIAGNOSIS — I25.708 CORONARY ARTERY DISEASE OF BYPASS GRAFT OF NATIVE HEART WITH STABLE ANGINA PECTORIS (HCC): Primary | ICD-10-CM

## 2021-04-21 DIAGNOSIS — E78.2 MIXED HYPERLIPIDEMIA: ICD-10-CM

## 2021-04-21 DIAGNOSIS — I50.22 CHRONIC SYSTOLIC HEART FAILURE (HCC): ICD-10-CM

## 2021-04-21 DIAGNOSIS — I10 ESSENTIAL HYPERTENSION: ICD-10-CM

## 2021-04-21 PROCEDURE — 99214 OFFICE O/P EST MOD 30 MIN: CPT | Performed by: INTERNAL MEDICINE

## 2021-04-21 PROCEDURE — 93000 ELECTROCARDIOGRAM COMPLETE: CPT | Performed by: INTERNAL MEDICINE

## 2021-04-21 NOTE — PROGRESS NOTES
Subjective:     Encounter Date:04/21/2021      Patient ID: Ramesh Daniels Jr. is a 61 y.o. male with a history of coronary artery disease, status post previous coronary artery bypass grafting, ischemic cardiomyopathy, hypertension, hyperlipidemia, type 2 diabetes mellitus, presenting today for follow-up.    Chief Complaint: Routine follow-up    This patient presents today for routine follow-up.  He has a history of coronary artery disease with remote history of coronary artery bypass grafting.  He also has ischemic cardiomyopathy.  He says that he has been doing well.  He has no complaints today.  He denies chest pain, shortness of breath, dyspnea on exertion.  No palpitations.  No significant lightheadedness or dizziness except when standing quickly from a seated position.  No syncopal episodes.  No orthopnea, PND, edema.  His weight has been stable.  He reports good control of his blood pressure and cholesterol.  He has not had any side effects occasions.  Overall, he says that he is doing well at this time.    Congestive Heart Failure  Presents for follow-up visit. Pertinent negatives include no abdominal pain, chest pain, edema, fatigue, orthopnea, palpitations, paroxysmal nocturnal dyspnea, shortness of breath or unexpected weight change. The symptoms have been stable. His past medical history is significant for CAD. Compliance with total regimen is %. Compliance with diet is %. Compliance with exercise is %. Compliance with medications is %.   Coronary Artery Disease  Presents for follow-up visit. Pertinent negatives include no chest pain, dizziness, leg swelling, palpitations, shortness of breath or weight gain. His past medical history is significant for CHF. The symptoms have been stable. Compliance with diet is variable. Compliance with exercise is variable. Compliance with medications is good.   Hypertension  This is a chronic problem. The problem is unchanged. The problem is  controlled. Pertinent negatives include no chest pain, headaches, orthopnea, palpitations, peripheral edema, PND or shortness of breath. There are no associated agents to hypertension. Risk factors for coronary artery disease include male gender, dyslipidemia and diabetes mellitus. Current antihypertension treatment includes calcium channel blockers, beta blockers, angiotensin blockers and diuretics. The current treatment provides significant improvement. There are no compliance problems.  Hypertensive end-organ damage includes CAD/MI and heart failure.       Current Outpatient Medications:   •  amLODIPine (NORVASC) 5 MG tablet, Take 7.5 mg by mouth Daily., Disp: , Rfl:   •  aspirin 81 MG EC tablet, Take 81 mg by mouth Daily., Disp: , Rfl:   •  clonazePAM (KlonoPIN) 1 MG tablet, Take 1 mg by mouth At Night As Needed for Seizures., Disp: , Rfl:   •  clopidogrel (PLAVIX) 75 MG tablet, Take 75 mg by mouth Daily., Disp: , Rfl:   •  cyclobenzaprine (FLEXERIL) 10 MG tablet, Take 10 mg by mouth 3 (Three) Times a Day As Needed for Muscle Spasms., Disp: , Rfl:   •  furosemide (LASIX) 20 MG tablet, Take 1 tablet by mouth Daily As Needed (shortness of breath, leg swelling, weight gain)., Disp: 90 tablet, Rfl: 3  •  metFORMIN (GLUCOPHAGE) 500 MG tablet, Take 500 mg by mouth Daily With Breakfast., Disp: , Rfl:   •  metoprolol succinate XL (TOPROL-XL) 25 MG 24 hr tablet, Take 1 tablet by mouth Daily., Disp: 90 tablet, Rfl: 3  •  rosuvastatin (CRESTOR) 20 MG tablet, Take 20 mg by mouth Daily., Disp: , Rfl:   •  sacubitril-valsartan (ENTRESTO) 49-51 MG tablet, Take 1 tablet by mouth Every 12 (Twelve) Hours., Disp: 180 tablet, Rfl: 3  •  sertraline (ZOLOFT) 50 MG tablet, Take 50 mg by mouth Daily., Disp: , Rfl:     No Known Allergies    Social History     Tobacco Use   • Smoking status: Never Smoker   • Smokeless tobacco: Never Used   Substance Use Topics   • Alcohol use: Yes     Comment: occ     Review of Systems   Constitutional:  Negative for fatigue, fever, unexpected weight change, weight gain and weight loss.   Cardiovascular: Negative for chest pain, dyspnea on exertion, leg swelling, orthopnea, palpitations, paroxysmal nocturnal dyspnea and syncope.   Respiratory: Negative for cough, shortness of breath and wheezing.    Hematologic/Lymphatic: Negative for bleeding problem. Does not bruise/bleed easily.   Gastrointestinal: Negative for abdominal pain, nausea and vomiting.   Neurological: Negative for dizziness, headaches and loss of balance.       ECG 12 Lead    Date/Time: 4/21/2021 3:59 PM  Performed by: Ryan Calvillo MD  Authorized by: Ryan Calvillo MD   Comparison: compared with previous ECG from 12/5/2018  Similar to previous ECG  Rhythm: sinus rhythm  Rate: normal  BPM: 78  Conduction: conduction normal  Q waves: V1, V2 and V3    QRS axis: normal  Other findings: non-specific ST-T wave changes and poor R wave progression    Clinical impression: abnormal EKG             Objective:     Vitals reviewed.   Constitutional:       General: Not in acute distress.     Appearance: Healthy appearance. Well-developed.   Eyes:      Extraocular Movements: Extraocular movements intact.      Pupils: Pupils are equal, round, and reactive to light.   HENT:      Head: Normocephalic and atraumatic.   Neck:      Thyroid: No thyroid mass.      Vascular: No JVD.   Pulmonary:      Effort: Pulmonary effort is normal.      Breath sounds: Normal breath sounds. No wheezing. No rhonchi. No rales.   Cardiovascular:      Normal rate. Regular rhythm.      Murmurs: There is no murmur.      No gallop.   Pulses:     Intact distal pulses.   Edema:     Peripheral edema absent.   Abdominal:      General: Bowel sounds are normal. There is no distension.      Palpations: Abdomen is soft.      Tenderness: There is no abdominal tenderness.   Musculoskeletal: Normal range of motion.      Extremities: No clubbing present.     Cervical back: Normal range of  "motion and neck supple. Skin:     General: Skin is warm and dry. There is no cyanosis.      Findings: No erythema or rash.   Neurological:      Mental Status: Alert and oriented to person, place, and time.      Cranial Nerves: No cranial nerve deficit.       /70   Pulse 77   Ht 182.9 cm (72\")   Wt 84.8 kg (187 lb)   SpO2 99%   BMI 25.36 kg/m²     Data/Lab Review:     I reviewed a lipid panel from July 2020 showing an LDL cholesterol of 65, HDL 56, triglycerides 90    =========================================    Echo 1/2020:  · Left ventricular systolic function is moderately decreased. Calculated EF = 35%.  · Left ventricular diastolic dysfunction (grade I) consistent with impaired relaxation.  · No hemodynamically significant valvular abnormalities identified on this study.    Echo 9/2018:  · Left ventricular systolic function is moderately decreased. Estimated EF appears to be in the range of 36 - 40%.  · The following left ventricular wall segments are hypokinetic: mid anterior, apical anterior, apical lateral, apical inferior, apical septal, apex hypokinetic, mid anteroseptal and basal anterior.  · Left ventricular wall thickness is consistent with mild concentric hypertrophy.  · Left ventricular diastolic dysfunction (grade I) consistent with impaired relaxation.    ==========================================    Cardiac Cath 6/2018:    Selective Coronary Angiography:     Left Main Coronary Artery: The left main coronary artery arises from the left coronary cusp and is a short vessel that currently gives rise to what appears to be a ramus intermedius and a left circumflex, while the LAD has been occluded at the ostium.  The left main coronary artery is short and has no significant disease.     Left Anterior Descending Artery: The left anterior descending artery arises from the distal left main coronary artery and is occluded in the ostial segment.     Ramus intermedius: Ramus intermedius arises from " the distal left main coronary artery and is an extremely small caliber diffusely diseased vessel.     Left Circumflex: The left circumflex artery arises from the distal left main artery and supplies a short segment that is diffusely diseased, then giving rise to a small diffusely diseased obtuse marginal branch and an atrial branch and then is occluded.     Right Coronary Artery: The right coronary artery arises from the right coronary cusp and is occluded in its proximal segment.     Bypass Graft Angiography:     Saphenous vein graft to the ramus intermedius: This bypass graft is occluded in its ostial segment.     Saphenous vein graft to the obtuse marginal: The saphenous vein graft is widely patent with no significant stenosis in the body of the graft.  The graft then fills what is likely a first obtuse marginal branch and then retrograde fills 2 additional marginal branches.  There is no significant focal stenoses in any of these vessels although they have diffuse nonobstructive disease.     Saphenous vein graft to the right coronary artery/posterior descending artery: The saphenous vein graft to the posterior descending artery is widely patent with no significant stenosis throughout the body of the graft.  After the insertion of the graft, the posterior descending artery is extremely small caliber and diffusely diseased and there are multiple posterolateral branches that are present, also extremely small caliber diffusely diseased.     Left internal mammary artery to left anterior descending artery: The left internal mammary artery bypass graft is widely patent.  This inserts into the midportion of the LAD.  The LAD then continues to the apex as a small caliber diffusely diseased vessels, supplying collateral flow to the occluded right coronary artery.  Retrograde, this graft fills a diffusely diseased mid left anterior descending artery and multiple small caliber diffusely diseased diagonal branches.  Once  again, the septal perforators are seen filling via left to right collateral flow, the right coronary artery to some degree.     Right Heart Catheterization:     Hemodynamics:     RA: <5 mmHg  RV: 25/<5 mmHg  PA: 20/10 mmHg  PCWP: 5 mmHg     Oximetry:     Aortic: 97 %  PA: 75%     Cardiac Output/Index:     Ronnie: 6.6 L/min/3.1 L/min/m2  Thermodilution: 5.6 L/m/2.64 L/min/m²        Assessment:          Diagnosis Plan   1. Coronary artery disease of bypass graft of native heart with stable angina pectoris (CMS/HCC)  ECG 12 Lead   2. Chronic systolic heart failure (CMS/HCC)  Adult Transthoracic Echo Complete W/ Cont if Necessary Per Protocol   3. Essential hypertension     4. Mixed hyperlipidemia          Plan:       1.  Coronary artery disease: The patient is clinically stable at this time.  He does not describe any ischemic symptoms.  He remains on aspirin, beta-blocker and statin therapies.  No changes or further work-up at this time.    2.  Ischemic cardiomyopathy/chronic systolic heart failure: Patient describes New York Heart Association class II symptoms at worst at this time and is euvolemic on today's exam.  He is appropriately on Toprol-XL and Entresto.  He is due for a surveillance echocardiogram at this time, which we will order today.  Otherwise, no changes in therapy at this time.    3.  Essential hypertension: Blood pressure is well controlled.  Continue current medications.    4.  Mixed hyperlipidemia: Patient's last LDL cholesterol was at goal, referenced above.  Continue statin therapy.    Patient's Body mass index is 25.36 kg/m². BMI is above normal parameters. Recommendations include: exercise counseling and nutrition counseling.    Follow-up will largely be pending the results of patient's echocardiogram.

## 2021-06-02 ENCOUNTER — HOSPITAL ENCOUNTER (OUTPATIENT)
Dept: CARDIOLOGY | Facility: HOSPITAL | Age: 61
Discharge: HOME | End: 2021-06-02
Admitting: INTERNAL MEDICINE

## 2021-06-02 DIAGNOSIS — I50.22 CHRONIC SYSTOLIC HEART FAILURE (HCC): ICD-10-CM

## 2021-06-02 PROCEDURE — 25010000002 PERFLUTREN 6.52 MG/ML SUSPENSION: Performed by: INTERNAL MEDICINE

## 2021-06-02 PROCEDURE — 93306 TTE W/DOPPLER COMPLETE: CPT | Performed by: INTERNAL MEDICINE

## 2021-06-02 PROCEDURE — 93306 TTE W/DOPPLER COMPLETE: CPT

## 2021-06-02 RX ADMIN — PERFLUTREN 8.48 MG: 6.52 INJECTION, SUSPENSION INTRAVENOUS at 15:48

## 2021-06-04 LAB
BH CV ECHO MEAS - AO MAX PG (FULL): -0.69 MMHG
BH CV ECHO MEAS - AO MAX PG: 5.7 MMHG
BH CV ECHO MEAS - AO MEAN PG (FULL): 0 MMHG
BH CV ECHO MEAS - AO MEAN PG: 3 MMHG
BH CV ECHO MEAS - AO ROOT AREA (BSA CORRECTED): 1.7
BH CV ECHO MEAS - AO ROOT AREA: 10.2 CM^2
BH CV ECHO MEAS - AO ROOT DIAM: 3.6 CM
BH CV ECHO MEAS - AO V2 MAX: 119 CM/SEC
BH CV ECHO MEAS - AO V2 MEAN: 84.5 CM/SEC
BH CV ECHO MEAS - AO V2 VTI: 25.8 CM
BH CV ECHO MEAS - AVA(I,A): 4.3 CM^2
BH CV ECHO MEAS - AVA(I,D): 4.3 CM^2
BH CV ECHO MEAS - AVA(V,A): 5.2 CM^2
BH CV ECHO MEAS - AVA(V,D): 5.2 CM^2
BH CV ECHO MEAS - BSA(HAYCOCK): 2.1 M^2
BH CV ECHO MEAS - BSA: 2.1 M^2
BH CV ECHO MEAS - BZI_BMI: 25.4 KILOGRAMS/M^2
BH CV ECHO MEAS - BZI_METRIC_HEIGHT: 182.9 CM
BH CV ECHO MEAS - BZI_METRIC_WEIGHT: 84.8 KG
BH CV ECHO MEAS - EDV(CUBED): 231.5 ML
BH CV ECHO MEAS - EDV(MOD-SP2): 212 ML
BH CV ECHO MEAS - EDV(MOD-SP4): 206 ML
BH CV ECHO MEAS - EDV(TEICH): 189.7 ML
BH CV ECHO MEAS - EF(CUBED): 21.7 %
BH CV ECHO MEAS - EF(MOD-SP2): 29.7 %
BH CV ECHO MEAS - EF(MOD-SP4): 35.4 %
BH CV ECHO MEAS - EF(TEICH): 17 %
BH CV ECHO MEAS - ESV(CUBED): 181.3 ML
BH CV ECHO MEAS - ESV(MOD-SP2): 149 ML
BH CV ECHO MEAS - ESV(MOD-SP4): 133 ML
BH CV ECHO MEAS - ESV(TEICH): 157.5 ML
BH CV ECHO MEAS - FS: 7.8 %
BH CV ECHO MEAS - IVS/LVPW: 0.98
BH CV ECHO MEAS - IVSD: 0.74 CM
BH CV ECHO MEAS - LAT PEAK E' VEL: 7.4 CM/SEC
BH CV ECHO MEAS - LV DIASTOLIC VOL/BSA (35-75): 99.5 ML/M^2
BH CV ECHO MEAS - LV MASS(C)D: 178.2 GRAMS
BH CV ECHO MEAS - LV MASS(C)DI: 86.1 GRAMS/M^2
BH CV ECHO MEAS - LV MAX PG: 6.4 MMHG
BH CV ECHO MEAS - LV MEAN PG: 3 MMHG
BH CV ECHO MEAS - LV SYSTOLIC VOL/BSA (12-30): 64.2 ML/M^2
BH CV ECHO MEAS - LV V1 MAX: 126 CM/SEC
BH CV ECHO MEAS - LV V1 MEAN: 78.2 CM/SEC
BH CV ECHO MEAS - LV V1 VTI: 22.8 CM
BH CV ECHO MEAS - LVIDD: 6.1 CM
BH CV ECHO MEAS - LVIDS: 5.7 CM
BH CV ECHO MEAS - LVLD AP2: 9.5 CM
BH CV ECHO MEAS - LVLD AP4: 9.3 CM
BH CV ECHO MEAS - LVLS AP2: 8.4 CM
BH CV ECHO MEAS - LVLS AP4: 8.2 CM
BH CV ECHO MEAS - LVOT AREA (M): 4.9 CM^2
BH CV ECHO MEAS - LVOT AREA: 4.9 CM^2
BH CV ECHO MEAS - LVOT DIAM: 2.5 CM
BH CV ECHO MEAS - LVPWD: 0.76 CM
BH CV ECHO MEAS - MED PEAK E' VEL: 4.7 CM/SEC
BH CV ECHO MEAS - MV A MAX VEL: 77.1 CM/SEC
BH CV ECHO MEAS - MV DEC SLOPE: 272 CM/SEC^2
BH CV ECHO MEAS - MV DEC TIME: 190 SEC
BH CV ECHO MEAS - MV E MAX VEL: 56.8 CM/SEC
BH CV ECHO MEAS - MV E/A: 0.74
BH CV ECHO MEAS - MV MAX PG: 2.9 MMHG
BH CV ECHO MEAS - MV MEAN PG: 1 MMHG
BH CV ECHO MEAS - MV P1/2T MAX VEL: 66.1 CM/SEC
BH CV ECHO MEAS - MV P1/2T: 71.2 MSEC
BH CV ECHO MEAS - MV V2 MAX: 85.2 CM/SEC
BH CV ECHO MEAS - MV V2 MEAN: 56.6 CM/SEC
BH CV ECHO MEAS - MV V2 VTI: 25.4 CM
BH CV ECHO MEAS - MVA P1/2T LCG: 3.3 CM^2
BH CV ECHO MEAS - MVA(P1/2T): 3.1 CM^2
BH CV ECHO MEAS - MVA(VTI): 4.4 CM^2
BH CV ECHO MEAS - SI(AO): 126.8 ML/M^2
BH CV ECHO MEAS - SI(CUBED): 24.2 ML/M^2
BH CV ECHO MEAS - SI(LVOT): 54.1 ML/M^2
BH CV ECHO MEAS - SI(MOD-SP2): 30.4 ML/M^2
BH CV ECHO MEAS - SI(MOD-SP4): 35.3 ML/M^2
BH CV ECHO MEAS - SI(TEICH): 15.6 ML/M^2
BH CV ECHO MEAS - SV(AO): 262.6 ML
BH CV ECHO MEAS - SV(CUBED): 50.2 ML
BH CV ECHO MEAS - SV(LVOT): 111.9 ML
BH CV ECHO MEAS - SV(MOD-SP2): 63 ML
BH CV ECHO MEAS - SV(MOD-SP4): 73 ML
BH CV ECHO MEAS - SV(TEICH): 32.3 ML
BH CV ECHO MEASUREMENTS AVERAGE E/E' RATIO: 9.39
BH CV XLRA - RV BASE: 3.8 CM
LEFT ATRIUM VOLUME INDEX: 19 ML/M2
LV EF 2D ECHO EST: 35 %
MAXIMAL PREDICTED HEART RATE: 158 BPM
STRESS TARGET HR: 134 BPM

## 2021-07-26 ENCOUNTER — TRANSCRIBE ORDERS (OUTPATIENT)
Dept: ADMINISTRATIVE | Facility: HOSPITAL | Age: 61
End: 2021-07-26

## 2021-07-26 DIAGNOSIS — I73.9 CLAUDICATION (HCC): Primary | ICD-10-CM

## 2021-07-28 ENCOUNTER — HOSPITAL ENCOUNTER (OUTPATIENT)
Dept: ULTRASOUND IMAGING | Facility: HOSPITAL | Age: 61
Discharge: HOME | End: 2021-07-28
Admitting: PHYSICIAN ASSISTANT

## 2021-07-28 DIAGNOSIS — I73.9 CLAUDICATION (HCC): ICD-10-CM

## 2021-07-28 PROCEDURE — 93923 UPR/LXTR ART STDY 3+ LVLS: CPT

## 2022-01-24 NOTE — TELEPHONE ENCOUNTER
I'm not sure. You could check with cardiac rehab. He could order it and see if his insurance will cover. It likely would be reasonable.    Custom Shielding Preamble Text Will Not Be Included With Simple Simulations (.......... X X Y Cm): A lead shield of 0.762 mm thickness is utilized to form a molded, custom shield with a

## 2022-02-22 ENCOUNTER — TELEPHONE (OUTPATIENT)
Dept: CARDIOLOGY | Facility: CLINIC | Age: 62
End: 2022-02-22

## 2022-02-22 NOTE — TELEPHONE ENCOUNTER
Patient is having left knee surgery and will need cardiac clearance  Also needs medication hold clearance for  plavix. Please advise.    Also when do you want to see this patient back in our office?

## 2022-02-24 NOTE — TELEPHONE ENCOUNTER
This patient was last seen in our office in April 2021.  At that time, he was thought to be stable.  An echocardiogram performed at that time showed stable findings with an ejection fraction of 35%.  This patient is at increased risk for invasive procedures given his history of coronary artery disease, ischemic cardiomyopathy, however with no unstable cardiac conditions, the patient would not require further testing as any such testing would not necessarily lower the operative risk in an asymptomatic patient.  Plavix can be held 7 days prior to the procedure, however aspirin 81 mg daily should be continued throughout the duration.

## 2022-03-03 ENCOUNTER — APPOINTMENT (OUTPATIENT)
Dept: PREADMISSION TESTING | Facility: HOSPITAL | Age: 62
End: 2022-03-03

## 2022-03-31 ENCOUNTER — PRE-ADMISSION TESTING (OUTPATIENT)
Dept: PREADMISSION TESTING | Facility: HOSPITAL | Age: 62
End: 2022-03-31

## 2022-03-31 VITALS
HEART RATE: 78 BPM | WEIGHT: 182.32 LBS | OXYGEN SATURATION: 97 % | RESPIRATION RATE: 16 BRPM | DIASTOLIC BLOOD PRESSURE: 74 MMHG | HEIGHT: 71 IN | BODY MASS INDEX: 25.52 KG/M2 | SYSTOLIC BLOOD PRESSURE: 127 MMHG

## 2022-03-31 LAB
ANION GAP SERPL CALCULATED.3IONS-SCNC: 10 MMOL/L (ref 5–15)
BUN SERPL-MCNC: 13 MG/DL (ref 8–23)
BUN/CREAT SERPL: 20.3 (ref 7–25)
CALCIUM SPEC-SCNC: 9.4 MG/DL (ref 8.6–10.5)
CHLORIDE SERPL-SCNC: 103 MMOL/L (ref 98–107)
CO2 SERPL-SCNC: 23 MMOL/L (ref 22–29)
CREAT SERPL-MCNC: 0.64 MG/DL (ref 0.76–1.27)
DEPRECATED RDW RBC AUTO: 42.7 FL (ref 37–54)
EGFRCR SERPLBLD CKD-EPI 2021: 106.4 ML/MIN/1.73
ERYTHROCYTE [DISTWIDTH] IN BLOOD BY AUTOMATED COUNT: 12.6 % (ref 12.3–15.4)
GLUCOSE SERPL-MCNC: 117 MG/DL (ref 65–99)
HCT VFR BLD AUTO: 43.3 % (ref 37.5–51)
HGB BLD-MCNC: 14.8 G/DL (ref 13–17.7)
MCH RBC QN AUTO: 31.6 PG (ref 26.6–33)
MCHC RBC AUTO-ENTMCNC: 34.2 G/DL (ref 31.5–35.7)
MCV RBC AUTO: 92.3 FL (ref 79–97)
PLATELET # BLD AUTO: 231 10*3/MM3 (ref 140–450)
PMV BLD AUTO: 9.1 FL (ref 6–12)
POTASSIUM SERPL-SCNC: 4 MMOL/L (ref 3.5–5.2)
RBC # BLD AUTO: 4.69 10*6/MM3 (ref 4.14–5.8)
SODIUM SERPL-SCNC: 136 MMOL/L (ref 136–145)
WBC NRBC COR # BLD: 6.04 10*3/MM3 (ref 3.4–10.8)

## 2022-03-31 PROCEDURE — 36415 COLL VENOUS BLD VENIPUNCTURE: CPT

## 2022-03-31 PROCEDURE — 85027 COMPLETE CBC AUTOMATED: CPT

## 2022-03-31 PROCEDURE — 93005 ELECTROCARDIOGRAM TRACING: CPT

## 2022-03-31 PROCEDURE — 80048 BASIC METABOLIC PNL TOTAL CA: CPT

## 2022-03-31 PROCEDURE — 93010 ELECTROCARDIOGRAM REPORT: CPT | Performed by: INTERNAL MEDICINE

## 2022-03-31 RX ORDER — ACETAMINOPHEN 325 MG/1
650 TABLET ORAL EVERY 6 HOURS PRN
COMMUNITY

## 2022-04-01 LAB
QT INTERVAL: 456 MS
QTC INTERVAL: 481 MS

## 2022-04-07 ENCOUNTER — TRANSCRIBE ORDERS (OUTPATIENT)
Dept: LAB | Facility: HOSPITAL | Age: 62
End: 2022-04-07

## 2022-04-07 DIAGNOSIS — Z11.59 SCREENING FOR VIRAL DISEASE: Primary | ICD-10-CM

## 2022-04-11 ENCOUNTER — LAB (OUTPATIENT)
Dept: LAB | Facility: HOSPITAL | Age: 62
End: 2022-04-11

## 2022-04-11 LAB — SARS-COV-2 ORF1AB RESP QL NAA+PROBE: NOT DETECTED

## 2022-04-11 PROCEDURE — C9803 HOPD COVID-19 SPEC COLLECT: HCPCS | Performed by: ORTHOPAEDIC SURGERY

## 2022-04-11 PROCEDURE — U0004 COV-19 TEST NON-CDC HGH THRU: HCPCS | Performed by: ORTHOPAEDIC SURGERY

## 2022-04-14 ENCOUNTER — TRANSCRIBE ORDERS (OUTPATIENT)
Dept: HOME HEALTH SERVICES | Facility: HOME HEALTHCARE | Age: 62
End: 2022-04-14

## 2022-04-14 ENCOUNTER — ANESTHESIA EVENT (OUTPATIENT)
Dept: PERIOP | Facility: HOSPITAL | Age: 62
End: 2022-04-14

## 2022-04-14 ENCOUNTER — ANESTHESIA (OUTPATIENT)
Dept: PERIOP | Facility: HOSPITAL | Age: 62
End: 2022-04-14

## 2022-04-14 ENCOUNTER — HOME HEALTH ADMISSION (OUTPATIENT)
Dept: HOME HEALTH SERVICES | Facility: HOME HEALTHCARE | Age: 62
End: 2022-04-14

## 2022-04-14 ENCOUNTER — HOSPITAL ENCOUNTER (OUTPATIENT)
Facility: HOSPITAL | Age: 62
Setting detail: SURGERY ADMIT
Discharge: HOME | End: 2022-04-14
Attending: ORTHOPAEDIC SURGERY | Admitting: ORTHOPAEDIC SURGERY

## 2022-04-14 VITALS
SYSTOLIC BLOOD PRESSURE: 123 MMHG | OXYGEN SATURATION: 99 % | DIASTOLIC BLOOD PRESSURE: 88 MMHG | TEMPERATURE: 98.2 F | HEART RATE: 68 BPM | RESPIRATION RATE: 16 BRPM

## 2022-04-14 DIAGNOSIS — Z96.652 AFTERCARE FOLLOWING LEFT KNEE JOINT REPLACEMENT SURGERY: Primary | ICD-10-CM

## 2022-04-14 DIAGNOSIS — Z47.1 AFTERCARE FOLLOWING LEFT KNEE JOINT REPLACEMENT SURGERY: Primary | ICD-10-CM

## 2022-04-14 DIAGNOSIS — Z96.652 TOTAL KNEE REPLACEMENT STATUS, LEFT: Primary | ICD-10-CM

## 2022-04-14 PROCEDURE — C1713 ANCHOR/SCREW BN/BN,TIS/BN: HCPCS | Performed by: ORTHOPAEDIC SURGERY

## 2022-04-14 PROCEDURE — 25010000002 PROPOFOL 10 MG/ML EMULSION

## 2022-04-14 PROCEDURE — 25010000002 ONDANSETRON PER 1 MG

## 2022-04-14 PROCEDURE — 76942 ECHO GUIDE FOR BIOPSY: CPT | Performed by: ORTHOPAEDIC SURGERY

## 2022-04-14 PROCEDURE — 25010000002 FENTANYL CITRATE (PF) 50 MCG/ML SOLUTION: Performed by: ANESTHESIOLOGY

## 2022-04-14 PROCEDURE — C9290 INJ, BUPIVACAINE LIPOSOME: HCPCS | Performed by: ORTHOPAEDIC SURGERY

## 2022-04-14 PROCEDURE — 0 BUPIVACAINE LIPOSOME 1.3 % SUSPENSION 20 ML VIAL: Performed by: ORTHOPAEDIC SURGERY

## 2022-04-14 PROCEDURE — C1776 JOINT DEVICE (IMPLANTABLE): HCPCS | Performed by: ORTHOPAEDIC SURGERY

## 2022-04-14 PROCEDURE — 25010000002 FENTANYL CITRATE (PF) 100 MCG/2ML SOLUTION

## 2022-04-14 DEVICE — STEM TIB/KN PERSONA CMT 5D SZG LT: Type: IMPLANTABLE DEVICE | Site: KNEE | Status: FUNCTIONAL

## 2022-04-14 DEVICE — PAT KN PERSONA ALLPOLY CMT 10X41MM: Type: IMPLANTABLE DEVICE | Site: KNEE | Status: FUNCTIONAL

## 2022-04-14 DEVICE — CMT BONE R 1X40: Type: IMPLANTABLE DEVICE | Site: KNEE | Status: FUNCTIONAL

## 2022-04-14 DEVICE — COMP FEM/KN PERSONA CR POR COCR STD SZ10 LT: Type: IMPLANTABLE DEVICE | Site: KNEE | Status: FUNCTIONAL

## 2022-04-14 DEVICE — ART/SRF KN PERSONA/VE PS GH 8TO11 11MM LT: Type: IMPLANTABLE DEVICE | Site: KNEE | Status: FUNCTIONAL

## 2022-04-14 DEVICE — CAP TOTL KN POROUS/HYBRID PRIMARY: Type: IMPLANTABLE DEVICE | Site: KNEE | Status: FUNCTIONAL

## 2022-04-14 RX ORDER — MIDAZOLAM HYDROCHLORIDE 1 MG/ML
2 INJECTION INTRAMUSCULAR; INTRAVENOUS ONCE
Status: DISCONTINUED | OUTPATIENT
Start: 2022-04-14 | End: 2022-04-14 | Stop reason: HOSPADM

## 2022-04-14 RX ORDER — MAGNESIUM HYDROXIDE 1200 MG/15ML
LIQUID ORAL AS NEEDED
Status: DISCONTINUED | OUTPATIENT
Start: 2022-04-14 | End: 2022-04-14 | Stop reason: HOSPADM

## 2022-04-14 RX ORDER — IBUPROFEN 600 MG/1
600 TABLET ORAL ONCE AS NEEDED
Status: DISCONTINUED | OUTPATIENT
Start: 2022-04-14 | End: 2022-04-14 | Stop reason: HOSPADM

## 2022-04-14 RX ORDER — SODIUM CHLORIDE 0.9 % (FLUSH) 0.9 %
10 SYRINGE (ML) INJECTION AS NEEDED
Status: DISCONTINUED | OUTPATIENT
Start: 2022-04-14 | End: 2022-04-14 | Stop reason: HOSPADM

## 2022-04-14 RX ORDER — FENTANYL CITRATE 50 UG/ML
INJECTION, SOLUTION INTRAMUSCULAR; INTRAVENOUS AS NEEDED
Status: DISCONTINUED | OUTPATIENT
Start: 2022-04-14 | End: 2022-04-14 | Stop reason: SURG

## 2022-04-14 RX ORDER — ONDANSETRON 4 MG/1
4 TABLET, FILM COATED ORAL EVERY 8 HOURS PRN
Qty: 20 TABLET | Refills: 0 | Status: SHIPPED | OUTPATIENT
Start: 2022-04-14

## 2022-04-14 RX ORDER — DROPERIDOL 2.5 MG/ML
0.62 INJECTION, SOLUTION INTRAMUSCULAR; INTRAVENOUS ONCE AS NEEDED
Status: DISCONTINUED | OUTPATIENT
Start: 2022-04-14 | End: 2022-04-14 | Stop reason: HOSPADM

## 2022-04-14 RX ORDER — EPHEDRINE SULFATE 50 MG/ML
INJECTION, SOLUTION INTRAVENOUS AS NEEDED
Status: DISCONTINUED | OUTPATIENT
Start: 2022-04-14 | End: 2022-04-14 | Stop reason: SURG

## 2022-04-14 RX ORDER — BUPIVACAINE HYDROCHLORIDE 5 MG/ML
INJECTION, SOLUTION EPIDURAL; INTRACAUDAL
Status: COMPLETED | OUTPATIENT
Start: 2022-04-14 | End: 2022-04-14

## 2022-04-14 RX ORDER — LABETALOL HYDROCHLORIDE 5 MG/ML
5 INJECTION, SOLUTION INTRAVENOUS
Status: DISCONTINUED | OUTPATIENT
Start: 2022-04-14 | End: 2022-04-14 | Stop reason: HOSPADM

## 2022-04-14 RX ORDER — OXYCODONE AND ACETAMINOPHEN 10; 325 MG/1; MG/1
1 TABLET ORAL ONCE AS NEEDED
Status: COMPLETED | OUTPATIENT
Start: 2022-04-14 | End: 2022-04-14

## 2022-04-14 RX ORDER — NALOXONE HCL 0.4 MG/ML
0.4 VIAL (ML) INJECTION AS NEEDED
Status: DISCONTINUED | OUTPATIENT
Start: 2022-04-14 | End: 2022-04-14 | Stop reason: HOSPADM

## 2022-04-14 RX ORDER — SODIUM CHLORIDE, SODIUM LACTATE, POTASSIUM CHLORIDE, CALCIUM CHLORIDE 600; 310; 30; 20 MG/100ML; MG/100ML; MG/100ML; MG/100ML
100 INJECTION, SOLUTION INTRAVENOUS CONTINUOUS PRN
Status: DISCONTINUED | OUTPATIENT
Start: 2022-04-14 | End: 2022-04-14 | Stop reason: HOSPADM

## 2022-04-14 RX ORDER — SULFAMETHOXAZOLE AND TRIMETHOPRIM 400; 80 MG/1; MG/1
1 TABLET ORAL 2 TIMES DAILY
Qty: 10 TABLET | Refills: 0 | Status: SHIPPED | OUTPATIENT
Start: 2022-04-14

## 2022-04-14 RX ORDER — PROMETHAZINE HYDROCHLORIDE 25 MG/1
12.5 TABLET ORAL ONCE AS NEEDED
Status: DISCONTINUED | OUTPATIENT
Start: 2022-04-14 | End: 2022-04-14 | Stop reason: HOSPADM

## 2022-04-14 RX ORDER — ONDANSETRON 2 MG/ML
4 INJECTION INTRAMUSCULAR; INTRAVENOUS ONCE AS NEEDED
Status: DISCONTINUED | OUTPATIENT
Start: 2022-04-14 | End: 2022-04-14 | Stop reason: HOSPADM

## 2022-04-14 RX ORDER — LIDOCAINE HYDROCHLORIDE 10 MG/ML
0.5 INJECTION, SOLUTION EPIDURAL; INFILTRATION; INTRACAUDAL; PERINEURAL ONCE AS NEEDED
Status: DISCONTINUED | OUTPATIENT
Start: 2022-04-14 | End: 2022-04-14 | Stop reason: HOSPADM

## 2022-04-14 RX ORDER — OXYCODONE AND ACETAMINOPHEN 7.5; 325 MG/1; MG/1
2 TABLET ORAL EVERY 4 HOURS PRN
Status: DISCONTINUED | OUTPATIENT
Start: 2022-04-14 | End: 2022-04-14 | Stop reason: HOSPADM

## 2022-04-14 RX ORDER — FENTANYL CITRATE 50 UG/ML
25 INJECTION, SOLUTION INTRAMUSCULAR; INTRAVENOUS
Status: DISCONTINUED | OUTPATIENT
Start: 2022-04-14 | End: 2022-04-14 | Stop reason: HOSPADM

## 2022-04-14 RX ORDER — ACETAMINOPHEN 500 MG
1000 TABLET ORAL ONCE
Status: COMPLETED | OUTPATIENT
Start: 2022-04-14 | End: 2022-04-14

## 2022-04-14 RX ORDER — LIDOCAINE HYDROCHLORIDE 10 MG/ML
0.5 INJECTION, SOLUTION EPIDURAL; INFILTRATION; INTRACAUDAL; PERINEURAL ONCE AS NEEDED
Status: DISCONTINUED | OUTPATIENT
Start: 2022-04-14 | End: 2022-04-14 | Stop reason: SDUPTHER

## 2022-04-14 RX ORDER — MIDAZOLAM HYDROCHLORIDE 1 MG/ML
1 INJECTION INTRAMUSCULAR; INTRAVENOUS
Status: DISCONTINUED | OUTPATIENT
Start: 2022-04-14 | End: 2022-04-14 | Stop reason: HOSPADM

## 2022-04-14 RX ORDER — ONDANSETRON 4 MG/1
4 TABLET, FILM COATED ORAL ONCE AS NEEDED
Status: DISCONTINUED | OUTPATIENT
Start: 2022-04-14 | End: 2022-04-14 | Stop reason: HOSPADM

## 2022-04-14 RX ORDER — SODIUM CHLORIDE, SODIUM LACTATE, POTASSIUM CHLORIDE, CALCIUM CHLORIDE 600; 310; 30; 20 MG/100ML; MG/100ML; MG/100ML; MG/100ML
1000 INJECTION, SOLUTION INTRAVENOUS CONTINUOUS
Status: DISCONTINUED | OUTPATIENT
Start: 2022-04-14 | End: 2022-04-14 | Stop reason: HOSPADM

## 2022-04-14 RX ORDER — FENTANYL CITRATE 50 UG/ML
50 INJECTION, SOLUTION INTRAMUSCULAR; INTRAVENOUS ONCE
Status: COMPLETED | OUTPATIENT
Start: 2022-04-14 | End: 2022-04-14

## 2022-04-14 RX ORDER — SODIUM CHLORIDE 0.9 % (FLUSH) 0.9 %
3-10 SYRINGE (ML) INJECTION AS NEEDED
Status: DISCONTINUED | OUTPATIENT
Start: 2022-04-14 | End: 2022-04-14 | Stop reason: HOSPADM

## 2022-04-14 RX ORDER — SODIUM CHLORIDE, SODIUM LACTATE, POTASSIUM CHLORIDE, CALCIUM CHLORIDE 600; 310; 30; 20 MG/100ML; MG/100ML; MG/100ML; MG/100ML
100 INJECTION, SOLUTION INTRAVENOUS CONTINUOUS
Status: DISCONTINUED | OUTPATIENT
Start: 2022-04-14 | End: 2022-04-14 | Stop reason: HOSPADM

## 2022-04-14 RX ORDER — ROCURONIUM BROMIDE 10 MG/ML
INJECTION, SOLUTION INTRAVENOUS AS NEEDED
Status: DISCONTINUED | OUTPATIENT
Start: 2022-04-14 | End: 2022-04-14 | Stop reason: SURG

## 2022-04-14 RX ORDER — ONDANSETRON 2 MG/ML
INJECTION INTRAMUSCULAR; INTRAVENOUS AS NEEDED
Status: DISCONTINUED | OUTPATIENT
Start: 2022-04-14 | End: 2022-04-14 | Stop reason: SURG

## 2022-04-14 RX ORDER — FLUMAZENIL 0.1 MG/ML
0.2 INJECTION INTRAVENOUS AS NEEDED
Status: DISCONTINUED | OUTPATIENT
Start: 2022-04-14 | End: 2022-04-14 | Stop reason: HOSPADM

## 2022-04-14 RX ORDER — SODIUM CHLORIDE 0.9 % (FLUSH) 0.9 %
3 SYRINGE (ML) INJECTION EVERY 12 HOURS SCHEDULED
Status: DISCONTINUED | OUTPATIENT
Start: 2022-04-14 | End: 2022-04-14 | Stop reason: HOSPADM

## 2022-04-14 RX ORDER — OXYCODONE AND ACETAMINOPHEN 7.5; 325 MG/1; MG/1
1 TABLET ORAL ONCE AS NEEDED
Status: DISCONTINUED | OUTPATIENT
Start: 2022-04-14 | End: 2022-04-14 | Stop reason: HOSPADM

## 2022-04-14 RX ORDER — SODIUM CHLORIDE 0.9 % (FLUSH) 0.9 %
3 SYRINGE (ML) INJECTION AS NEEDED
Status: DISCONTINUED | OUTPATIENT
Start: 2022-04-14 | End: 2022-04-14 | Stop reason: HOSPADM

## 2022-04-14 RX ORDER — SODIUM CHLORIDE 0.9 % (FLUSH) 0.9 %
10 SYRINGE (ML) INJECTION EVERY 12 HOURS SCHEDULED
Status: DISCONTINUED | OUTPATIENT
Start: 2022-04-14 | End: 2022-04-14 | Stop reason: HOSPADM

## 2022-04-14 RX ORDER — LIDOCAINE HYDROCHLORIDE 20 MG/ML
INJECTION, SOLUTION EPIDURAL; INFILTRATION; INTRACAUDAL; PERINEURAL AS NEEDED
Status: DISCONTINUED | OUTPATIENT
Start: 2022-04-14 | End: 2022-04-14 | Stop reason: SURG

## 2022-04-14 RX ORDER — PROPOFOL 10 MG/ML
VIAL (ML) INTRAVENOUS AS NEEDED
Status: DISCONTINUED | OUTPATIENT
Start: 2022-04-14 | End: 2022-04-14 | Stop reason: SURG

## 2022-04-14 RX ORDER — OXYCODONE AND ACETAMINOPHEN 10; 325 MG/1; MG/1
1 TABLET ORAL EVERY 4 HOURS PRN
Qty: 40 TABLET | Refills: 0 | Status: SHIPPED | OUTPATIENT
Start: 2022-04-14

## 2022-04-14 RX ORDER — NEOSTIGMINE METHYLSULFATE 5 MG/5 ML
SYRINGE (ML) INTRAVENOUS AS NEEDED
Status: DISCONTINUED | OUTPATIENT
Start: 2022-04-14 | End: 2022-04-14 | Stop reason: SURG

## 2022-04-14 RX ORDER — TRANEXAMIC ACID 100 MG/ML
INJECTION, SOLUTION INTRAVENOUS AS NEEDED
Status: DISCONTINUED | OUTPATIENT
Start: 2022-04-14 | End: 2022-04-14 | Stop reason: SURG

## 2022-04-14 RX ADMIN — EPHEDRINE SULFATE 10 MG: 50 INJECTION INTRAVENOUS at 11:18

## 2022-04-14 RX ADMIN — EPHEDRINE SULFATE 5 MG: 50 INJECTION INTRAVENOUS at 10:24

## 2022-04-14 RX ADMIN — ROCURONIUM BROMIDE 50 MG: 10 SOLUTION INTRAVENOUS at 09:54

## 2022-04-14 RX ADMIN — LIDOCAINE HYDROCHLORIDE 100 MG: 20 INJECTION, SOLUTION EPIDURAL; INFILTRATION; INTRACAUDAL; PERINEURAL at 09:54

## 2022-04-14 RX ADMIN — TRANEXAMIC ACID 1000 MG: 100 INJECTION, SOLUTION INTRAVENOUS at 10:05

## 2022-04-14 RX ADMIN — GLYCOPYRROLATE 0.4 MG: 0.2 INJECTION, SOLUTION INTRAMUSCULAR; INTRAVENOUS at 10:22

## 2022-04-14 RX ADMIN — ACETAMINOPHEN 1000 MG: 500 TABLET, FILM COATED ORAL at 09:28

## 2022-04-14 RX ADMIN — EPHEDRINE SULFATE 10 MG: 50 INJECTION INTRAVENOUS at 11:08

## 2022-04-14 RX ADMIN — CEFAZOLIN SODIUM 2 G: 1 INJECTION, POWDER, FOR SOLUTION INTRAMUSCULAR; INTRAVENOUS at 10:01

## 2022-04-14 RX ADMIN — SODIUM CHLORIDE, POTASSIUM CHLORIDE, SODIUM LACTATE AND CALCIUM CHLORIDE 1000 ML: 600; 310; 30; 20 INJECTION, SOLUTION INTRAVENOUS at 09:09

## 2022-04-14 RX ADMIN — FENTANYL CITRATE 100 MCG: 50 INJECTION, SOLUTION INTRAMUSCULAR; INTRAVENOUS at 09:52

## 2022-04-14 RX ADMIN — OXYCODONE AND ACETAMINOPHEN 1 TABLET: 325; 10 TABLET ORAL at 12:33

## 2022-04-14 RX ADMIN — PROPOFOL 120 MG: 10 INJECTION, EMULSION INTRAVENOUS at 09:54

## 2022-04-14 RX ADMIN — ONDANSETRON 4 MG: 2 INJECTION INTRAMUSCULAR; INTRAVENOUS at 11:18

## 2022-04-14 RX ADMIN — EPHEDRINE SULFATE 10 MG: 50 INJECTION INTRAVENOUS at 10:57

## 2022-04-14 RX ADMIN — FENTANYL CITRATE 50 MCG: 50 INJECTION INTRAMUSCULAR; INTRAVENOUS at 09:28

## 2022-04-14 RX ADMIN — EPHEDRINE SULFATE 5 MG: 50 INJECTION INTRAVENOUS at 10:48

## 2022-04-14 RX ADMIN — Medication 3 MG: at 11:28

## 2022-04-14 RX ADMIN — ONDANSETRON 4 MG: 2 INJECTION INTRAMUSCULAR; INTRAVENOUS at 09:52

## 2022-04-14 RX ADMIN — GLYCOPYRROLATE 0.4 MG: 0.2 INJECTION, SOLUTION INTRAMUSCULAR; INTRAVENOUS at 11:28

## 2022-04-14 RX ADMIN — BUPIVACAINE HYDROCHLORIDE 20 ML: 5 INJECTION, SOLUTION EPIDURAL; INTRACAUDAL; PERINEURAL at 09:32

## 2022-04-14 NOTE — OP NOTE
PREOPERATIVE DIAGNOSIS: Left knee primary osteoarthritis    POSTOPERATIVE DIAGNOSIS: Left knee primary osteoarthritis     PROCEDURE:  Left Total Knee Arthroplasty     SURGEON:  Valeriy Diamond M.D.     ASSISTANT:  Miguel Oneal PA-C    The assistant aided in limb position as well as retractor placement.  The assistant was also critical in implantation of the prosthesis.  In addition to this, the assistant was responsible for wound closure.  It was necessary to have the assistant present in order to complete the procedure.    ANESTHESIA:  General with Block    ESTIMATED BLOOD LOSS:  250 mL    COMPLICATIONS:  None.    CONDITION:  Stable.    INDICATIONS:  The patient is a 62 y.o. male who  presents today for a left total knee arthroplasty for primary osteoarthritis.  The patient has failed conservative management including: time, activity modifications, NSAIDs and corticosteroids.    PROCEDURE:  The patient was interviewed in the pre-anesthesia area.  The operative limb was then marked with a marking pen.  The patient was administered a regional block per the anesthesia team.  The patient was then taken to the operative suite where general endotracheal anesthesia was performed by the anesthesia team.  A time out was then called to confirm the administration of 2 grams of Ancef as well as the administration of tranexamic acid prior to incision.  The patient was then prepped and draped in standard sterile fashion using ChloraPrep.      Once fully prepped and draped, the limb was reprepped with ChloraPrep.  Sterile marking pen was then used to tanner out the tibia distally as well as the first web space.   A tourniquet was not used.  The operative foot was placed in a Carrion leg howell and a standard midline incision was carried out followed by a medial parapatellar arthrotomy.  The knee did demonstrate a large knee joint effusion with tricompartmental osteoarthrosis.     Attention was then turned to the distal femoral resection.   The assistant held a Hohmann retractor medially and laterally protecting the collateral ligaments as well as extensor mechanism.  A drill was used to enter the intramedullary canal just anterior to the PCL insertion.  The extramedullary guide was then placed.  The guide was set at 5 degrees of valgus.  We then made a plus 2 mm distal femoral cut.  We then sized the femur to a size 10 using an anterior referencing guide and penned the femoral block into position.  Care was taken to make sure that the block was perpendicular to the epicondylar axis and parallel to the Whitesides line, thus in external rotation.  The assistant then held the knee flexed with the collateral ligaments protected while I made the anterior, posterior as well as chamfer cuts.      Attention was then turned to the tibia.  Then the assistant placed a PCL retractor posteriorly and subluxed the tibia forward as the ACL and PCL were resected.  The infrapatellar fat pad was excised and Hohmann retractor was placed medially and laterally.  An extramedullary tibial guide was then set parallel to the tibia on the AP and lateral views in line with the first web space and second metatarsal, just slightly medial to the medial center of the ankle.  The guide was set at 7 degrees posterior slope.  A 2/10 guide was then used to measure for approximately a 2 mm resection medially and 10 mm resection laterally.  An assistant held Hohmann retractors both medial and laterally at this point with a PCL retractor posteriorly protecting the soft tissue around the tibia circumferentially.  The proximal tibia was then removed en bloc.  The knee was then brought into extension and an assistant held a distractor in the medial and lateral joint space respectively as the medial and lateral menisci were then excised with a radiofrequency device.  We also performed a medial soft tissue release around the posteromedial corner of the knee.  Curved osteotome was then used to  remove any posterior osteophytes off the medial and lateral femoral condyles as well as strip the posterior capsule while an assistant held the femur elevated with the knee in a flexed position.  A size G tibial tray was then placed with the central portion of the tibial tray in line with the medial third of the tibial tubercle, thus in external rotation, and pinned into position.     We then placed the femoral component into position, taking care to make sure that the femoral component was flush with the lateral cortex.   A trial tibia and 10 medial congruent trial spacer was placed.  The patient did achieve full extension and was stable in flexion.  The lug holes for the femur were then finished and the trial components were removed.      Attention was then turned to the patella.  The assistant held the patella everted as I resected down to 16 mm.  We then prepped for a size 41 patella implant  to sit superomedially for tracking purposes.     At this point in time, all trial components were removed.  The knee was copiously irrigated with pulsatile lavage.  We then cemented in our size G tibia component with a press-fit size 10 femoral component as well as our 41 mm patellar component.  A 10 mm medial congruent trial spacer was placed with the knee in full extension as we waited on the cement to cure.  While I held the knee in extension with pressure across the joint line tip stabilize a cement and assistant was responsible for meticulous cement clean up.  Once the cement was allowed to cure, we then trialed with our trial spacers.  We elected to go with a size 11 mm medial congruent polyethylene spacer as with this in position the knee achieved full extension and was stable in flexion, as well as having 2 mm of opening with varus/valgus stress testing.  Thus, the trial component was removed.  The knee was once again copiously irrigated with pulsatile lavage.  We then injected the posterior capsule with a local  cocktail solution, both posteromedially and posterolaterally.  Care was taken to make sure there was no remaining cement.  A size 11 mm medial congruent polyethylene spacer was then placed.  The knee was then brought into 30 degrees of flexion.  The tourniquet was deflated and meticulous hemostasis was maintained with electrocautery.      The assistant then closed the extensor mechanism with a #1 Vicryl suture.  The skin was then approximated with a 3-0 Vicryl suture and closed with superglue skin closure  The patient was placed in a sterile dressing.  The patient was awakened from anesthesia without difficulty and was transferred to the PACU in stable condition.  All sponge, needle and instrument counts were correct at the end of the procedure.     Valeriy Diamond M.D.

## 2022-04-14 NOTE — NURSING NOTE
Referral for home care services received from Dr Diamond prior to surgery and [facility] care will accept referral upon discharge.

## 2022-04-14 NOTE — H&P
Orthopedic History and Physical    Pt Name: Ramesh Daniels Jr.  MRN: [id number]  YOB: 1960  Date: 4/14/2022      HPI: 62-year-old male presents today for a left total knee arthroplasty for left primary knee osteoarthritis.      Past Medical/Surgical History:   Past Medical History:   Diagnosis Date   • Arthritis    • CAD (coronary artery disease)    • CHF (congestive heart failure) (HCC)    • ED (erectile dysfunction)    • Hyperlipidemia    • Hypertension      Past Surgical History:   Procedure Laterality Date   • CARDIAC CATHETERIZATION     • CARDIAC CATHETERIZATION N/A 6/1/2018    Procedure: Left Heart Cath, coronaries, grafts, possible;  Surgeon: Ryan Calvillo MD;  Location:  [facility] CATH [facility]AS[facility];  Service: Cardiovascular   • CARDIAC CATHETERIZATION N/A 6/1/2018    Procedure: Right Heart Cath;  Surgeon: Ryan Calvillo MD;  Location:  [facility] CATH [facility]VAS[facility] LOCATION;  Service: Cardiovascular   • CORONARY ARTERY BYPASS GRAFT     • HAND SURGERY     • LAMINECTOMY     • TONSILLECTOMY           Social History:   Social History     Socioeconomic History   • Marital status:    Tobacco Use   • Smoking status: Never Smoker   • Smokeless tobacco: Never Used   Vaping Use   • Vaping Use: Never used   Substance and Sexual Activity   • Alcohol use: Yes     Comment: occ beer   • Drug use: No   • Sexual activity: Defer            Medications: No current facility-administered medications for this encounter.    Allergies: No Known Allergies       Review of Systems   Constitutional: Negative.    HENT: Negative.    Eyes: Negative.    Respiratory: Negative.    Cardiovascular: Negative.    Gastrointestinal: Negative.    Endocrine: Negative.    Genitourinary: Negative.    Musculoskeletal: Negative.    Skin: Negative.    Allergic/Immunologic: Negative.    Neurological: Negative.    Hematological: Negative.    Psychiatric/Behavioral: Negative.           Physical Exam  Constitutional:        Appearance: He is well-developed.   HENT:      Head: Normocephalic and atraumatic.   Eyes:      Conjunctiva/sclera: Conjunctivae normal.   Pulmonary:      Effort: Pulmonary effort is normal.      Breath sounds: Normal breath sounds.   Abdominal:      Palpations: Abdomen is soft.   Musculoskeletal:         General: Tenderness present.      Cervical back: Normal range of motion and neck supple.   Skin:     General: Skin is warm and dry.   Neurological:      Mental Status: He is alert and oriented to person, place, and time.   Psychiatric:         Behavior: Behavior normal.         Thought Content: Thought content normal.         Judgment: Judgment normal.         Ortho Exam:  Left knee tenderness palpation swelling decreased range of motion      Imaging:  Imaging Results (Last 72 Hours)     ** No results found for the last 72 hours. **          Labs:   Lab Results (last 24 hours)     ** No results found for the last 24 hours. **          Impression: Left knee primary osteoarthritis      Surgical Plan: Left total knee arthroplasty.      Electronically signed by Valeriy Diamond MD on 4/14/2022 at 08:37 CDT

## 2022-04-14 NOTE — ANESTHESIA PROCEDURE NOTES
Peripheral Block    Pre-sedation assessment completed: 4/14/2022 9:25 AM    Patient reassessed immediately prior to procedure    Patient location during procedure: holding area  Start time: 4/14/2022 9:31 AM  Stop time: 4/14/2022 9:32 AM  Reason for block: procedure for pain, at surgeon's request, post-op pain management and Requested by Dr. Diamond  Performed by  Anesthesiologist: Gerry Olsen MD  Preanesthetic Checklist  Completed: patient identified, IV checked, site marked, risks and benefits discussed, surgical consent, monitors and equipment checked, pre-op evaluation and timeout performed  Prep:  Pt Position: supine  Sterile barriers:mask, gloves, cap and washed/disinfected hands  Prep: ChloraPrep  Patient monitoring: blood pressure monitoring, continuous pulse oximetry and EKG  Procedure    Sedation: yes  Performed under: MAC  Guidance:ultrasound guided and femoral artery identified in adductor canal and local anesthetic seen surrounding artery    ULTRASOUND INTERPRETATION.  Using ultrasound guidance a 20 G gauge needle was placed in close proximity to the femoral nerve, at which point, under ultrasound guidance anesthetic was injected in the area of the nerve and spread of the anesthesia was seen on ultrasound in close proximity thereto.  There were no abnormalities seen on ultrasound; a digital image was taken; and the patient tolerated the procedure with no complications. Images:still images obtained (picture printed and placed in patients chart)    Laterality:left  Block Type:adductor canal block  Injection Technique:single-shot  Needle Type:echogenic      Medications Used: bupivacaine PF (MARCAINE) injection 0.5%, 20 mL  Med administered at 4/14/2022 9:32 AM      Post Assessment  Injection Assessment: negative aspiration for heme, no paresthesia on injection and incremental injection  Patient Tolerance:comfortable throughout block  Complications:no

## 2022-04-14 NOTE — ANESTHESIA PROCEDURE NOTES
Airway  Urgency: elective    Date/Time: 4/14/2022 9:56 AM  Airway not difficult    General Information and Staff    Patient location during procedure: OR  CRNA: Екатерина Cat CRNA    Indications and Patient Condition  Indications for airway management: airway protection    Preoxygenated: yes  Mask difficulty assessment: 1 - vent by mask    Final Airway Details  Final airway type: endotracheal airway      Successful airway: ETT  Cuffed: yes   Successful intubation technique: video laryngoscopy  Facilitating devices/methods: intubating stylet  Endotracheal tube insertion site: oral  Blade: Nunez  Blade size: 4  ETT size (mm): 7.5  Cormack-Lehane Classification: grade I - full view of glottis  Placement verified by: capnometry   Cuff volume (mL): 5  Measured from: teeth  ETT/EBT  to teeth (cm): 23  Number of attempts at approach: 1  Assessment: lips, teeth, and gum same as pre-op and atraumatic intubation

## 2022-04-14 NOTE — DISCHARGE INSTRUCTIONS
What to expect after a Nerve Block  Nerve blocks administered to block pain affect many types of nerves, including those nerves that control movement, pain, and normal sensation.  Following a nerve block, you may notice some bruising at the site where the block was given.  You may experience sensations such as:  numbness of the affected area or limb, tingling, heaviness (that is the limb feels heavy to you), weakness or inability to move the affected arm or leg, or a feeling as if your arm or leg has “fallen asleep”.    A nerve block can last from 9-18 hours depending on the medications used.  Usually the weakness wears off first followed by the tingling and heaviness.  As the block wears off, you may begin to notice pain; however, this sequence of events may occur in any order.  Typically, you will be able to move your limb before you will feel it.  Once a nerve block begins to wear off, the effects are usually completely gone within 60 minutes.    If you experience continued side effects that you believe are block related for longer than 48 hours, please call your healthcare provider.  Please see block-specific instructions below.    Instructions for any Block involving the leg/foot:  If you have had a leg/foot block, you should not bear weight on the affected leg until the block has worn off.  After the block has worn off, weight bearing should be as directed by your surgeon.  You may be sent home with crutches.  You are at high risk for falling because of the anesthetic effects on your leg.  Please use caution when standing or trying to move or walk.  Have someone assist you until your leg and foot function have returned to normal.    Protection of a “blocked” limb  After a nerve block, you cannot feel pain, pressure, or extremes of temperature in the affected limb.  And because of this, your blocked limb is at more risk for injury.  For example, it is possible to burn your limb on an extremely hot surface  without feeling it.  When resting, it is important to reposition your limb periodically to avoid prolonged pressure on it.  This may require the use of pillows and padding.  While sleeping, you should avoid rolling onto the affected limb or putting too much pressure on it.  If you have a cast or tight dressing, check the color of your toes of the affected limb.  Call your surgeon if they look discolored (that is, dusky, dark colored)  Use caution in cold weather.  Cover your limb appropriately to protect it from the cold.  Pain Management  Your surgeon will give you a prescription for pain medication.  Begin taking this before the nerve block wears off.  Bear in mind that sometimes the block can wear off in the middle of the night.

## 2022-04-14 NOTE — ANESTHESIA POSTPROCEDURE EVALUATION
Patient: Ramesh Daniels Jr.    Procedure Summary     Date: 04/14/22 Room / Location:  [facility] /  [facility]    Anesthesia Start: 0951 Anesthesia Stop: 1209    Procedure: LEFT TOTAL KNEE ARTHROPLASTY (Left Knee) Diagnosis: (LEFT KNEE PAIN)    Surgeons: Valeriy Diamond MD Provider: Екатерина Cat CRNA    Anesthesia Type: general with block ASA Status: 3          Anesthesia Type: general with block    Vitals  Vitals Value Taken Time   /77 04/14/22 1248   Temp 98.2 °F (36.8 °C) 04/14/22 1236   Pulse 66 04/14/22 1248   Resp 16 04/14/22 1248   SpO2 98 % 04/14/22 1248           Post Anesthesia Care and Evaluation    Patient location during evaluation: PACU  Patient participation: complete - patient participated  Level of consciousness: awake and alert  Pain management: adequate  Airway patency: patent  Anesthetic complications: No anesthetic complications    Cardiovascular status: acceptable  Respiratory status: acceptable  Hydration status: acceptable    Comments: Blood pressure 123/88, pulse 68, temperature 98.2 °F (36.8 °C), resp. rate 16, SpO2 99 %.    Pt discharged from PACU based on stacia score >8

## 2022-04-14 NOTE — ANESTHESIA PREPROCEDURE EVALUATION
Anesthesia Evaluation     Patient summary reviewed   no history of anesthetic complications:  NPO Solid Status: > 6 hours  NPO Liquid Status: > 4 hours           Airway   Mallampati: II  TM distance: >3 FB  Neck ROM: full  Dental    (+) poor dentition    Pulmonary    Cardiovascular   Exercise tolerance: good (4-7 METS)    (+) hypertension, CAD, CABG, angina, CHF Systolic <55%, hyperlipidemia,   (-) cardiac stents    ROS comment: · Estimated left ventricular EF = 35%.  · Left ventricular diastolic dysfunction is noted.  · The left ventricular cavity is mildly dilated.        Neuro/Psych  (-) seizures, CVA  GI/Hepatic/Renal/Endo    (-) no renal disease, diabetes    Musculoskeletal     Abdominal    Substance History      OB/GYN          Other                        Anesthesia Plan    ASA 3     general with block   (Reduced LVEF discussed; teeth injury discussed-pt accepts; )  intravenous induction     Anesthetic plan, all risks, benefits, and alternatives have been provided, discussed and informed consent has been obtained with: patient.        CODE STATUS:

## 2022-04-15 ENCOUNTER — HOME CARE VISIT (OUTPATIENT)
Dept: HOME HEALTH SERVICES | Facility: CLINIC | Age: 62
End: 2022-04-15

## 2022-04-15 VITALS
WEIGHT: 175 LBS | DIASTOLIC BLOOD PRESSURE: 60 MMHG | OXYGEN SATURATION: 96 % | HEIGHT: 71 IN | SYSTOLIC BLOOD PRESSURE: 110 MMHG | BODY MASS INDEX: 24.5 KG/M2 | TEMPERATURE: 98.7 F | HEART RATE: 82 BPM | RESPIRATION RATE: 16 BRPM

## 2022-04-15 PROCEDURE — G0151 HHCP-SERV OF PT,EA 15 MIN: HCPCS

## 2022-04-15 NOTE — HOME HEALTH
Patient denied any COVID 19 signs or symptoms at time of PT visit. S/P L TKA.    SOC Note:    Home Health ordered for: disciplines: PT    Reason for Hosp/Primary Dx/Co-morbidities: L TKA    Focus of Care: L TKA protocol    Current Functional status/mobility/DME: amb w/4wRW height adjusted, has necessary DME    HB status/Living Arrangements: has steps to enter/exit home.    Skin Integrity/wound status: anterior knee incision covered w/non-removable dressing    Code Status: FULL    Fall Risk: LOW

## 2022-04-20 ENCOUNTER — HOME CARE VISIT (OUTPATIENT)
Dept: HOME HEALTH SERVICES | Facility: HOME HEALTHCARE | Age: 62
End: 2022-04-20

## 2022-04-22 ENCOUNTER — HOME CARE VISIT (OUTPATIENT)
Dept: HOME HEALTH SERVICES | Facility: CLINIC | Age: 62
End: 2022-04-22

## 2022-04-22 NOTE — CASE COMMUNICATION
PT visit missed due to patient c/o inc. pain and bruising and c/o pain and swelling in testicle on operable side. Patient refused visit. Called and left voicemail for Valeriy Diamond regarding patient's complaints. Scheduled number of visits not met.

## 2022-04-25 ENCOUNTER — HOME CARE VISIT (OUTPATIENT)
Dept: HOME HEALTH SERVICES | Facility: CLINIC | Age: 62
End: 2022-04-25

## 2022-04-25 VITALS
SYSTOLIC BLOOD PRESSURE: 120 MMHG | TEMPERATURE: 98.5 F | RESPIRATION RATE: 18 BRPM | DIASTOLIC BLOOD PRESSURE: 72 MMHG | HEART RATE: 89 BPM | OXYGEN SATURATION: 97 %

## 2022-04-25 PROCEDURE — G0157 HHC PT ASSISTANT EA 15: HCPCS

## 2022-04-25 NOTE — HOME HEALTH
SUBJECTIVE: Patient says he has had signficant pain, swelling, and bruising after surgery. He reports swelling had gone up into groin last week. He says pain, swelling, and bruising have improved and he is feeling much better.     FOCUS OF CARE/SKILLED NEED: strength, ROM, gait mechanics, pain and swelling control    TEACHING/INTERVENTIONS:  HEP instruction, gait mechanics, education regarding pain and swelling control.    PROGRESS TOWARD GOALS: Patient exhibits overall improvement in pain, brusing, and swelling as observed by photographs patient took last week. Patient had signficant bruising and swelling in photos but signifcant improvement observed this date.     PHYSICIAN CONTACT:  NO    INSURANCE CHANGES? NO    FALLS SINCE LAST VISIT? NO    MEDICATION CHANGES SINCE LAST VISIT? NO    PLAN FOR NEXT VISIT: HEP progression, ROM, gait training.

## 2022-04-27 ENCOUNTER — HOME CARE VISIT (OUTPATIENT)
Dept: HOME HEALTH SERVICES | Facility: CLINIC | Age: 62
End: 2022-04-27

## 2022-04-27 VITALS
OXYGEN SATURATION: 96 % | HEART RATE: 79 BPM | TEMPERATURE: 98.5 F | DIASTOLIC BLOOD PRESSURE: 72 MMHG | RESPIRATION RATE: 18 BRPM | SYSTOLIC BLOOD PRESSURE: 118 MMHG

## 2022-04-27 PROCEDURE — G0157 HHC PT ASSISTANT EA 15: HCPCS

## 2022-04-27 NOTE — HOME HEALTH
SUBJECTIVE: Patient says his pain has been high since Monday and he feels like his knee is cramping/and throbbing at times. He says pain is an 8/10 with pain medication currently. He says he will see his surgeon on Friday for follow up.     FOCUS OF CARE/SKILLED NEED: gait mechanics, strength, ROM    TEACHING/INTERVENTIONS: gait mechanics, HEP progression, ROM, step training    PROGRESS TOWARD GOALS: Patient continues with high pain levels. Swelling has improved and knee flexion ROM is improving. Patient exhibits improvemet with gait mechanics and is steady and safe with ambulation on steps.    PHYSICIAN CONTACT: NO    INSURANCE CHANGES? NO    FALLS SINCE LAST VISIT? NO    MEDICATION CHANGES SINCE LAST VISIT? NO    PLAN FOR NEXT VISIT: Contine with focus on balance, gait mechanics, and ROM

## 2022-05-02 ENCOUNTER — HOME CARE VISIT (OUTPATIENT)
Dept: HOME HEALTH SERVICES | Facility: CLINIC | Age: 62
End: 2022-05-02

## 2022-05-02 NOTE — HOME HEALTH
SUBJECTIVE: Patient refuses visi after therapist arrived due to increased pain levels. Rescheduled visit for later in the week.

## 2022-05-04 ENCOUNTER — HOME CARE VISIT (OUTPATIENT)
Dept: HOME HEALTH SERVICES | Facility: CLINIC | Age: 62
End: 2022-05-04

## 2022-05-04 VITALS
TEMPERATURE: 99 F | DIASTOLIC BLOOD PRESSURE: 62 MMHG | SYSTOLIC BLOOD PRESSURE: 110 MMHG | HEART RATE: 99 BPM | RESPIRATION RATE: 16 BRPM | OXYGEN SATURATION: 99 %

## 2022-05-04 PROCEDURE — G0157 HHC PT ASSISTANT EA 15: HCPCS

## 2022-05-04 NOTE — HOME HEALTH
SUBJECTIVE:  Patient states he is feeling much better today since he got his pain medication refilled.     FOCUS OF CARE/SKILLED NEED:     TEACHING/INTERVENTIONS:     PROGRESS TOWARD GOALS:    PHYSICIAN CONTACT: NO    INSURANCE CHANGES? NO    FALLS SINCE LAST VISIT? NO    MEDICATION CHANGES SINCE LAST VISIT? NO    PLAN FOR NEXT VISIT: DC to continue with outpatient PT after next visit.

## 2022-05-06 ENCOUNTER — HOME CARE VISIT (OUTPATIENT)
Dept: HOME HEALTH SERVICES | Facility: CLINIC | Age: 62
End: 2022-05-06

## 2022-05-06 VITALS
OXYGEN SATURATION: 96 % | RESPIRATION RATE: 18 BRPM | HEART RATE: 83 BPM | DIASTOLIC BLOOD PRESSURE: 50 MMHG | SYSTOLIC BLOOD PRESSURE: 90 MMHG | TEMPERATURE: 97.1 F

## 2022-05-06 PROCEDURE — G0151 HHCP-SERV OF PT,EA 15 MIN: HCPCS

## 2022-05-06 NOTE — CASE COMMUNICATION
To Lobo Covarrubias    Patient discharged from home health physical therapy and he is starting outpatient physical therapy next week.

## 2022-05-14 NOTE — CASE COMMUNICATION
End of Shift Note: Medical    Diagnosis: pneumonia of R mid lobe    Pain Management: Last Pain Score: 0/10.                                         Nothing given this shift.   Diet: cardiac diet, feeder    LBM:  PTA    Activity up with 2 assist, walker and gaitbelt    Significant Events: see previous note about possible hallucination episode, pt converted from A Fib to sinus abraham    LDAs: peripheral IV in right forearm and left hand    Discharge:  Anticipated discharge date:  TBD.              Disposition: Home with Home Services   Patient missed a PTA visit from [facility] on 4/20/2022    Reason: Patient request      For your records only.   As per home health protocol, MD must be notified of missed/cancelled visits; therefore the prescribed frequency was not met.

## 2025-04-21 ENCOUNTER — TRANSCRIBE ORDERS (OUTPATIENT)
Dept: ADMINISTRATIVE | Facility: HOSPITAL | Age: 65
End: 2025-04-21
Payer: MEDICARE

## 2025-04-21 DIAGNOSIS — I11.0 HYPERTENSIVE HEART DISEASE WITH HEART FAILURE: Primary | ICD-10-CM

## (undated) DEVICE — CVR BRD ARM 13X30

## (undated) DEVICE — 4-PORT MANIFOLD: Brand: NEPTUNE 2

## (undated) DEVICE — SPONGE,LAP,12"X12",XR,ST,5/PK,40PK/CS: Brand: MEDLINE

## (undated) DEVICE — PK TURNOVER RM ADV

## (undated) DEVICE — GLV SURG DERMASSURE GRN LF PF 8.0

## (undated) DEVICE — SOLIDIFIER LIQUI LOC PLUS 2000CC

## (undated) DEVICE — GLV SURG SENSICARE W/ALOE PF LF 6.5 STRL

## (undated) DEVICE — KT NDL GUIDE STRL 18GA

## (undated) DEVICE — Device

## (undated) DEVICE — PK KN TOTL 30

## (undated) DEVICE — SOL IRR NACL 0.9PCT BT 1000ML

## (undated) DEVICE — ELECTRD BLD EZ CLN STD 4IN

## (undated) DEVICE — STERILE PATIENT PROTECTIVE PAD FOR IMP® KNEE POSITIONERS & COHESIVE WRAP (10 / CASE): Brand: DE MAYO KNEE POSITIONER®

## (undated) DEVICE — POOLE SUCTION INSTRUMENT WITH REMOVABLE SHEATH: Brand: POOLE

## (undated) DEVICE — OPTIFOAM GENTLE SA, POSTOP, 4X12: Brand: MEDLINE

## (undated) DEVICE — ANTIBACTERIAL UNDYED BRAIDED (POLYGLACTIN 910), SYNTHETIC ABSORBABLE SUTURE: Brand: COATED VICRYL

## (undated) DEVICE — PK CATH CARD 30

## (undated) DEVICE — PINNACLE INTRODUCER SHEATH: Brand: PINNACLE

## (undated) DEVICE — CANN CO2/O2 NASL A/

## (undated) DEVICE — SWAN-GANZ POLYMER BLEND TRUE SIZE C-TIP CONTROLCATH TD: Brand: SWAN-GANZ CONTROLCATH TRUE SIZE

## (undated) DEVICE — CATH F6 ST+ MP A1 100CM: Brand: SUPER TORQUE

## (undated) DEVICE — GLV SURG SENSICARE W/ALOE PF LF 7 STRL

## (undated) DEVICE — DUAL CUT SAGITTAL BLADE

## (undated) DEVICE — SHEET,DRAPE,53X77,STERILE: Brand: MEDLINE

## (undated) DEVICE — SYR LUERLOK 20CC BX/50

## (undated) DEVICE — GLV SURG BIOGEL LTX PF 7 1/2

## (undated) DEVICE — INF TL MULIPACK FR6: Brand: INFINITI

## (undated) DEVICE — GLV SURG TRIUMPH MICRO PF LTX 7.5 STRL

## (undated) DEVICE — SYS CLS SKIN PREMIERPRO EXOFINFUSION 22CM

## (undated) DEVICE — REAMR PAT W PILOTHL 46MM

## (undated) DEVICE — ANGIO-SEAL VIP VASCULAR CLOSURE DEVICE: Brand: ANGIO-SEAL

## (undated) DEVICE — GOWN,SIRUS,NON REINFRCD,LARGE,SET IN SL: Brand: MEDLINE

## (undated) DEVICE — ELECTRD PAD DEFIB A/

## (undated) DEVICE — STPCK 3/WY HP M/RA W/OFF/HNDL 1050PSI STRL

## (undated) DEVICE — Device: Brand: MEDEX

## (undated) DEVICE — NEEDLE, QUINCKE, 18GX3.5": Brand: MEDLINE